# Patient Record
Sex: FEMALE | Race: WHITE | Employment: FULL TIME | URBAN - METROPOLITAN AREA
[De-identification: names, ages, dates, MRNs, and addresses within clinical notes are randomized per-mention and may not be internally consistent; named-entity substitution may affect disease eponyms.]

---

## 2017-01-03 ENCOUNTER — HOSPITAL ENCOUNTER (OUTPATIENT)
Dept: OTHER | Age: 22
Discharge: HOME | End: 2017-01-03
Attending: FAMILY MEDICINE | Admitting: CLINIC/CENTER

## 2017-01-05 LAB
CULTURE: NORMAL
REPORT STATUS: NORMAL
REQUEST PROBLEM: NORMAL
SPECIMEN: NORMAL
TOTAL COLONY COUNT: NORMAL

## 2018-08-28 LAB
C. TRACHOMATIS, EXTERNAL RESULT: NORMAL
N. GONORRHOEAE, EXTERNAL RESULT: NORMAL

## 2018-10-02 LAB
ABO, EXTERNAL RESULT: NORMAL
HEP B, EXTERNAL RESULT: NORMAL
HIV, EXTERNAL RESULT: NON REACTIVE
RHOGAM, EXTERNAL RESULT: NORMAL
RPR, EXTERNAL RESULT: NON REACTIVE
RUBELLA TITER, EXTERNAL RESULT: NORMAL

## 2018-10-11 ENCOUNTER — APPOINTMENT (OUTPATIENT)
Dept: ULTRASOUND IMAGING | Age: 23
End: 2018-10-11
Payer: COMMERCIAL

## 2018-10-11 ENCOUNTER — HOSPITAL ENCOUNTER (EMERGENCY)
Age: 23
Discharge: HOME | End: 2018-10-11
Attending: EMERGENCY MEDICINE
Payer: COMMERCIAL

## 2018-10-11 VITALS
SYSTOLIC BLOOD PRESSURE: 101 MMHG | WEIGHT: 115 LBS | RESPIRATION RATE: 15 BRPM | HEIGHT: 64 IN | BODY MASS INDEX: 19.63 KG/M2 | HEART RATE: 64 BPM | OXYGEN SATURATION: 100 % | TEMPERATURE: 97.8 F | DIASTOLIC BLOOD PRESSURE: 71 MMHG

## 2018-10-11 DIAGNOSIS — R10.9 ABDOMINAL PAIN DURING PREGNANCY, ANTEPARTUM: Primary | ICD-10-CM

## 2018-10-11 DIAGNOSIS — O26.899 ABDOMINAL PAIN DURING PREGNANCY, ANTEPARTUM: Primary | ICD-10-CM

## 2018-10-11 LAB
ALBUMIN SERPL-MCNC: 3.9 GM/DL (ref 3.4–5)
ALP BLD-CCNC: 50 IU/L (ref 40–129)
ALT SERPL-CCNC: 11 U/L (ref 10–40)
ANION GAP SERPL CALCULATED.3IONS-SCNC: 13 MMOL/L (ref 4–16)
AST SERPL-CCNC: 15 IU/L (ref 15–37)
BACTERIA: NEGATIVE /HPF
BASOPHILS ABSOLUTE: 0 K/CU MM
BASOPHILS RELATIVE PERCENT: 0.5 % (ref 0–1)
BILIRUB SERPL-MCNC: 0.3 MG/DL (ref 0–1)
BILIRUBIN URINE: NEGATIVE MG/DL
BLOOD, URINE: NEGATIVE
BUN BLDV-MCNC: 7 MG/DL (ref 6–23)
CALCIUM SERPL-MCNC: 8.8 MG/DL (ref 8.3–10.6)
CHLORIDE BLD-SCNC: 101 MMOL/L (ref 99–110)
CLARITY: CLEAR
CO2: 22 MMOL/L (ref 21–32)
COLOR: YELLOW
CREAT SERPL-MCNC: 0.4 MG/DL (ref 0.6–1.1)
DIFFERENTIAL TYPE: ABNORMAL
EOSINOPHILS ABSOLUTE: 0.1 K/CU MM
EOSINOPHILS RELATIVE PERCENT: 1 % (ref 0–3)
GFR AFRICAN AMERICAN: >60 ML/MIN/1.73M2
GFR NON-AFRICAN AMERICAN: >60 ML/MIN/1.73M2
GLUCOSE BLD-MCNC: 113 MG/DL (ref 70–99)
GLUCOSE, URINE: NEGATIVE MG/DL
GONADOTROPIN, CHORIONIC (HCG) QUANT: NORMAL UIU/ML
HCT VFR BLD CALC: 36.5 % (ref 37–47)
HEMOGLOBIN: 12.3 GM/DL (ref 12.5–16)
IMMATURE NEUTROPHIL %: 0.4 % (ref 0–0.43)
KETONES, URINE: NEGATIVE MG/DL
LEUKOCYTE ESTERASE, URINE: NEGATIVE
LIPASE: 29 IU/L (ref 13–60)
LYMPHOCYTES ABSOLUTE: 2 K/CU MM
LYMPHOCYTES RELATIVE PERCENT: 26.5 % (ref 24–44)
MCH RBC QN AUTO: 32.3 PG (ref 27–31)
MCHC RBC AUTO-ENTMCNC: 33.7 % (ref 32–36)
MCV RBC AUTO: 95.8 FL (ref 78–100)
MONOCYTES ABSOLUTE: 0.3 K/CU MM
MONOCYTES RELATIVE PERCENT: 4.4 % (ref 0–4)
MUCUS: ABNORMAL HPF
NITRITE URINE, QUANTITATIVE: NEGATIVE
NUCLEATED RBC %: 0 %
PDW BLD-RTO: 13 % (ref 11.7–14.9)
PH, URINE: 6 (ref 5–8)
PLATELET # BLD: 237 K/CU MM (ref 140–440)
PMV BLD AUTO: 8.6 FL (ref 7.5–11.1)
POTASSIUM SERPL-SCNC: 4 MMOL/L (ref 3.5–5.1)
PROTEIN UA: NEGATIVE MG/DL
RBC # BLD: 3.81 M/CU MM (ref 4.2–5.4)
RBC URINE: <1 /HPF (ref 0–6)
SEGMENTED NEUTROPHILS ABSOLUTE COUNT: 4.9 K/CU MM
SEGMENTED NEUTROPHILS RELATIVE PERCENT: 67.2 % (ref 36–66)
SODIUM BLD-SCNC: 136 MMOL/L (ref 135–145)
SPECIFIC GRAVITY UA: 1.02 (ref 1–1.03)
SQUAMOUS EPITHELIAL: 11 /HPF
TOTAL IMMATURE NEUTOROPHIL: 0.03 K/CU MM
TOTAL NUCLEATED RBC: 0 K/CU MM
TOTAL PROTEIN: 6.3 GM/DL (ref 6.4–8.2)
TRICHOMONAS: ABNORMAL /HPF
TROPONIN T: <0.01 NG/ML
UROBILINOGEN, URINE: NORMAL MG/DL (ref 0.2–1)
WBC # BLD: 7.4 K/CU MM (ref 4–10.5)
WBC UA: ABNORMAL /HPF (ref 0–5)

## 2018-10-11 PROCEDURE — 83690 ASSAY OF LIPASE: CPT

## 2018-10-11 PROCEDURE — 36415 COLL VENOUS BLD VENIPUNCTURE: CPT

## 2018-10-11 PROCEDURE — 76801 OB US < 14 WKS SINGLE FETUS: CPT

## 2018-10-11 PROCEDURE — 6370000000 HC RX 637 (ALT 250 FOR IP): Performed by: PHYSICIAN ASSISTANT

## 2018-10-11 PROCEDURE — 84702 CHORIONIC GONADOTROPIN TEST: CPT

## 2018-10-11 PROCEDURE — 80053 COMPREHEN METABOLIC PANEL: CPT

## 2018-10-11 PROCEDURE — 93975 VASCULAR STUDY: CPT

## 2018-10-11 PROCEDURE — 85025 COMPLETE CBC W/AUTO DIFF WBC: CPT

## 2018-10-11 PROCEDURE — 6360000002 HC RX W HCPCS: Performed by: PHYSICIAN ASSISTANT

## 2018-10-11 PROCEDURE — 96375 TX/PRO/DX INJ NEW DRUG ADDON: CPT

## 2018-10-11 PROCEDURE — 81001 URINALYSIS AUTO W/SCOPE: CPT

## 2018-10-11 PROCEDURE — 96374 THER/PROPH/DIAG INJ IV PUSH: CPT

## 2018-10-11 PROCEDURE — 93010 ELECTROCARDIOGRAM REPORT: CPT | Performed by: INTERNAL MEDICINE

## 2018-10-11 PROCEDURE — 2500000003 HC RX 250 WO HCPCS: Performed by: PHYSICIAN ASSISTANT

## 2018-10-11 PROCEDURE — S0028 INJECTION, FAMOTIDINE, 20 MG: HCPCS | Performed by: PHYSICIAN ASSISTANT

## 2018-10-11 PROCEDURE — 76705 ECHO EXAM OF ABDOMEN: CPT

## 2018-10-11 PROCEDURE — 93005 ELECTROCARDIOGRAM TRACING: CPT | Performed by: EMERGENCY MEDICINE

## 2018-10-11 PROCEDURE — 99285 EMERGENCY DEPT VISIT HI MDM: CPT

## 2018-10-11 PROCEDURE — 2580000003 HC RX 258: Performed by: PHYSICIAN ASSISTANT

## 2018-10-11 PROCEDURE — 84484 ASSAY OF TROPONIN QUANT: CPT

## 2018-10-11 RX ORDER — FAMOTIDINE 20 MG/1
20 TABLET, FILM COATED ORAL 2 TIMES DAILY
Qty: 30 TABLET | Refills: 0 | Status: ON HOLD | OUTPATIENT
Start: 2018-10-11 | End: 2019-03-26 | Stop reason: ALTCHOICE

## 2018-10-11 RX ORDER — ONDANSETRON 2 MG/ML
4 INJECTION INTRAMUSCULAR; INTRAVENOUS EVERY 30 MIN PRN
Status: DISCONTINUED | OUTPATIENT
Start: 2018-10-11 | End: 2018-10-11 | Stop reason: HOSPADM

## 2018-10-11 RX ORDER — MAGNESIUM HYDROXIDE/ALUMINUM HYDROXICE/SIMETHICONE 120; 1200; 1200 MG/30ML; MG/30ML; MG/30ML
30 SUSPENSION ORAL ONCE
Status: COMPLETED | OUTPATIENT
Start: 2018-10-11 | End: 2018-10-11

## 2018-10-11 RX ORDER — 0.9 % SODIUM CHLORIDE 0.9 %
1000 INTRAVENOUS SOLUTION INTRAVENOUS ONCE
Status: COMPLETED | OUTPATIENT
Start: 2018-10-11 | End: 2018-10-11

## 2018-10-11 RX ORDER — ACETAMINOPHEN 500 MG
1000 TABLET ORAL ONCE
Status: COMPLETED | OUTPATIENT
Start: 2018-10-11 | End: 2018-10-11

## 2018-10-11 RX ADMIN — ONDANSETRON 4 MG: 2 INJECTION INTRAMUSCULAR; INTRAVENOUS at 10:43

## 2018-10-11 RX ADMIN — LIDOCAINE HYDROCHLORIDE 15 ML: 20 SOLUTION ORAL; TOPICAL at 13:01

## 2018-10-11 RX ADMIN — ALUMINUM HYDROXIDE, MAGNESIUM HYDROXIDE, AND SIMETHICONE 30 ML: 200; 200; 20 SUSPENSION ORAL at 13:01

## 2018-10-11 RX ADMIN — ACETAMINOPHEN 1000 MG: 500 TABLET, FILM COATED ORAL at 10:33

## 2018-10-11 RX ADMIN — SODIUM CHLORIDE 1000 ML: 9 INJECTION, SOLUTION INTRAVENOUS at 10:41

## 2018-10-11 RX ADMIN — FAMOTIDINE 20 MG: 10 INJECTION, SOLUTION INTRAVENOUS at 10:44

## 2018-10-11 ASSESSMENT — PAIN DESCRIPTION - DESCRIPTORS: DESCRIPTORS: SHARP

## 2018-10-11 ASSESSMENT — PAIN DESCRIPTION - ONSET: ONSET: PROGRESSIVE

## 2018-10-11 ASSESSMENT — PAIN SCALES - GENERAL
PAINLEVEL_OUTOF10: 8

## 2018-10-11 ASSESSMENT — PAIN DESCRIPTION - PAIN TYPE: TYPE: ACUTE PAIN

## 2018-10-11 ASSESSMENT — PAIN DESCRIPTION - FREQUENCY: FREQUENCY: CONTINUOUS

## 2018-10-11 ASSESSMENT — PAIN DESCRIPTION - LOCATION: LOCATION: ABDOMEN

## 2018-10-11 ASSESSMENT — PAIN DESCRIPTION - ORIENTATION: ORIENTATION: MID

## 2018-10-11 ASSESSMENT — PAIN DESCRIPTION - PROGRESSION: CLINICAL_PROGRESSION: GRADUALLY WORSENING

## 2018-10-11 NOTE — ED PROVIDER NOTES
fetal movement. Fetal measurements:    BPD 22.68 mm    Head circumference 82.64 mm    Abdominal circumference 70.20 mm    Femur length 11.05 mm    Estimated fetal weight 76.25 g    Measurements:    Estimated gestational age by current ultrasound: 17 weeks and 4 days    Estimated gestational by LMP/prior ultrasound: 13 weeks and 0 days    Estimated Due Date: 04/14/2018                    US ABDOMEN LIMITED (Final result)   Result time 10/11/18 12:54:01   Final result by Trinity Nava MD (10/11/18 12:54:01)                Impression:    Unremarkable right upper quadrant ultrasound. Narrative:    EXAMINATION:  RIGHT UPPER QUADRANT ULTRASOUND    10/11/2018 11:47 am    COMPARISON:  None. HISTORY:  ORDERING SYSTEM PROVIDED HISTORY: RUQ pain  TECHNOLOGIST PROVIDED HISTORY:  Reason for exam:->RUQ pain  Acuity: Acute  Type of Exam: Initial    FINDINGS:  LIVER:  The liver demonstrates normal echogenicity without evidence of  intrahepatic biliary ductal dilatation. BILIARY SYSTEM:  Gallbladder is unremarkable without evidence of  pericholecystic fluid, wall thickening or stones.  Negative sonographic  Ryan's sign. Common bile duct is within normal limits measuring 2 mm. RIGHT KIDNEY: The right kidney is grossly unremarkable without evidence of  hydronephrosis. Right kidney measures 11.5 cm in length. PANCREAS:  Visualized portions of the pancreas are unremarkable. OTHER: No evidence of right upper quadrant ascites.                      EKG Interpretation  Please see ED physician's note for EKG interpretation      ED COURSE & MEDICAL DECISION MAKING      Vital signs and nursing notes reviewed during ED course. I have independently evaluated this patient . Supervising MD - Dr Daniel Murillo - present in the Emergency Department, available for consultation, throughout entirety of  patient care. All pertinent Lab data and radiographic results reviewed with patient at bedside.       The patient and / or the family were informed of the results of any tests, a time was given to answer questions, a plan was proposed and they agreed with plan. Differential diagnosis: Abdominal Aortic Aneurysm, Ischemic Bowel, Bowel Obstruction, Acute Cholecystitis, Acute Appendicitis, other    Clinical  IMPRESSION    1. Abdominal pain during pregnancy, antepartum        Patient presents with generalized abdominal pain/nausea and chest pain and early pregnancy. On exam, tearful 78-year-old female, afebrile, nontoxic, vitals are stable she appears him and am intact. Lungs are clear auscultation, 100% on room air. No lower extremity edema. Abdomen is soft with normoactive bowel sounds however there is generalized tenderness in the left lower quadrant abdomen and epigastric region with mild guarding, no rebound. No other peritoneal signs. Given pregnancy status, discussed the patient pain control with Pepcid and Tylenol she is agreeable with. She is already on Zofran and states that she is aware of the possible education side effects of pregnancy and is comfortable continuing this medication while in the ED. Start her on IV fluids in addition to antiemetics and pain control. CBC without leukocytosis, hemoglobin stable 12.3 compared to previous. CMP without significant derangement. Normal lipase. Troponin within normal limits. Beta hCG is elevated 56,470, UA is clear. While awaiting ultrasound, I reassessed this patient. She was noted to have a BP of 93/56 at bedside check. At this point, I consulted with my attending physician who performed a bedside FAST ultrasound which did show a live IUP and no evidence of free fluid in the abdomen. Patient is given additional oral GI cocktail and states pain significantly improved with 3/10. Blood pressure also normalized after continued fluids. Right upper quadrant ultrasound was unremarkable.   OB ultrasound shows a single live IUP with gestational age 17 weeks and 3

## 2018-10-11 NOTE — ED PROVIDER NOTES
I independently examined and evaluated Clark Rojas. In brief,  Pregnant female presenting with abdominal pain and nausea for three hours. Vitals:    10/11/18 1006   BP: 114/77   Pulse: 97   Resp: 18   Temp: 97.9 °F (36.6 °C)   SpO2: 100%     Focused exam revealed     General appearance:  No acute distress. Skin:  Warm. Dry. Eye:  Extraocular movements intact. Ears, nose, mouth and throat:  Oral mucosa moist   Neck:  Trachea midline. Extremity:  No swelling. range of motion intact  Heart:  Regular rate and rhythm, normal S1 & S2, no extra heart sounds. Perfusion:  intact  Respiratory:  Lungs clear to auscultation bilaterally. Respirations nonlabored. Abdominal:  Normal bowel sounds. Soft. lower abdominal tenderness no rebound or regarding. Non distended. Back:  No CVA tenderness to palpation     Neurological:  Alert and oriented times 3. No focal neuro deficits. Psychiatric:  Appropriate       ED course:   urine noted to be unremarkable. Ultrasound confirming IUP  No significant pathology in the adnexa. Patient discharged to home with GYN followup     gina Angulo diagnostic, treatment, and disposition decisions were made by myself in conjunction with the advanced practice provider. For all further details of the patient's emergency department visit, please see the advanced practice provider's documentation. Comment: Please note this report has been produced using speech recognition software and may contain errors related to that system including errors in grammar, punctuation, and spelling, as well as words and phrases that may be inappropriate. If there are any questions or concerns please feel free to contact the dictating provider for clarification.        Rosibel Salguero MD  10/12/18 2100       Rosibel Salguero MD  10/12/18 2539

## 2018-10-12 LAB
EKG ATRIAL RATE: 86 BPM
EKG DIAGNOSIS: NORMAL
EKG P AXIS: 65 DEGREES
EKG P-R INTERVAL: 108 MS
EKG Q-T INTERVAL: 358 MS
EKG QRS DURATION: 82 MS
EKG QTC CALCULATION (BAZETT): 428 MS
EKG R AXIS: 84 DEGREES
EKG T AXIS: 47 DEGREES
EKG VENTRICULAR RATE: 86 BPM

## 2019-03-20 LAB
GBS, EXTERNAL RESULT: NEGATIVE
GBS, EXTERNAL RESULT: NORMAL

## 2019-03-26 ENCOUNTER — HOSPITAL ENCOUNTER (OUTPATIENT)
Age: 24
Discharge: HOME | End: 2019-03-26
Attending: OBSTETRICS & GYNECOLOGY | Admitting: OBSTETRICS & GYNECOLOGY
Payer: COMMERCIAL

## 2019-03-26 VITALS
RESPIRATION RATE: 18 BRPM | SYSTOLIC BLOOD PRESSURE: 127 MMHG | HEART RATE: 79 BPM | BODY MASS INDEX: 23.74 KG/M2 | WEIGHT: 134 LBS | TEMPERATURE: 95.8 F | OXYGEN SATURATION: 98 % | DIASTOLIC BLOOD PRESSURE: 83 MMHG | HEIGHT: 63 IN

## 2019-03-26 PROBLEM — Z33.1 PREGNANCY, INCIDENTAL: Status: ACTIVE | Noted: 2019-03-26

## 2019-03-26 LAB
AMPHETAMINES: NEGATIVE
BACTERIA: ABNORMAL /HPF
BARBITURATE SCREEN URINE: NEGATIVE
BENZODIAZEPINE SCREEN, URINE: NEGATIVE
BILIRUBIN URINE: NEGATIVE MG/DL
BLOOD, URINE: NEGATIVE
CANNABINOID SCREEN URINE: NEGATIVE
CLARITY: CLEAR
COCAINE METABOLITE: NEGATIVE
COLOR: YELLOW
GLUCOSE, URINE: NEGATIVE MG/DL
KETONES, URINE: NEGATIVE MG/DL
LEUKOCYTE ESTERASE, URINE: ABNORMAL
MUCUS: ABNORMAL HPF
NITRITE URINE, QUANTITATIVE: NEGATIVE
OPIATES, URINE: NEGATIVE
OXYCODONE: NORMAL
PH, URINE: 7 (ref 5–8)
PHENCYCLIDINE, URINE: NEGATIVE
PROTEIN UA: 30 MG/DL
RBC URINE: 1 /HPF (ref 0–6)
SPECIFIC GRAVITY UA: 1.02 (ref 1–1.03)
SQUAMOUS EPITHELIAL: 20 /HPF
TRANSITIONAL EPITHELIAL: <1 /HPF
TRICHOMONAS: ABNORMAL /HPF
UROBILINOGEN, URINE: 1 MG/DL (ref 0.2–1)
WBC UA: 1 /HPF (ref 0–5)

## 2019-03-26 PROCEDURE — 80307 DRUG TEST PRSMV CHEM ANLYZR: CPT

## 2019-03-26 PROCEDURE — 99211 OFF/OP EST MAY X REQ PHY/QHP: CPT

## 2019-03-26 PROCEDURE — 81001 URINALYSIS AUTO W/SCOPE: CPT

## 2019-03-28 ENCOUNTER — APPOINTMENT (OUTPATIENT)
Dept: ULTRASOUND IMAGING | Age: 24
End: 2019-03-28
Payer: COMMERCIAL

## 2019-03-28 ENCOUNTER — HOSPITAL ENCOUNTER (OUTPATIENT)
Age: 24
Discharge: HOME | End: 2019-03-28
Attending: OBSTETRICS & GYNECOLOGY | Admitting: OBSTETRICS & GYNECOLOGY
Payer: COMMERCIAL

## 2019-03-28 VITALS
BODY MASS INDEX: 23.74 KG/M2 | OXYGEN SATURATION: 100 % | DIASTOLIC BLOOD PRESSURE: 65 MMHG | SYSTOLIC BLOOD PRESSURE: 106 MMHG | HEART RATE: 92 BPM | WEIGHT: 134 LBS | TEMPERATURE: 98.4 F | RESPIRATION RATE: 18 BRPM | HEIGHT: 63 IN

## 2019-03-28 PROBLEM — O26.90 PREGNANCY RELATED CONDITION: Status: ACTIVE | Noted: 2019-03-28

## 2019-03-28 LAB
ALBUMIN SERPL-MCNC: 3.4 GM/DL (ref 3.4–5)
ALP BLD-CCNC: 163 IU/L (ref 40–129)
ALT SERPL-CCNC: 26 U/L (ref 10–40)
ANION GAP SERPL CALCULATED.3IONS-SCNC: 11 MMOL/L (ref 4–16)
AST SERPL-CCNC: 30 IU/L (ref 15–37)
BACTERIA: ABNORMAL /HPF
BASOPHILS ABSOLUTE: 0 K/CU MM
BASOPHILS RELATIVE PERCENT: 0.1 % (ref 0–1)
BILIRUB SERPL-MCNC: 0.7 MG/DL (ref 0–1)
BILIRUBIN URINE: ABNORMAL MG/DL
BLOOD, URINE: NEGATIVE
BUN BLDV-MCNC: 8 MG/DL (ref 6–23)
CALCIUM SERPL-MCNC: 7.6 MG/DL (ref 8.3–10.6)
CHLORIDE BLD-SCNC: 99 MMOL/L (ref 99–110)
CLARITY: CLEAR
CO2: 20 MMOL/L (ref 21–32)
COLOR: ABNORMAL
CREAT SERPL-MCNC: 0.4 MG/DL (ref 0.6–1.1)
DIFFERENTIAL TYPE: ABNORMAL
EOSINOPHILS ABSOLUTE: 0 K/CU MM
EOSINOPHILS RELATIVE PERCENT: 0.6 % (ref 0–3)
GFR AFRICAN AMERICAN: >60 ML/MIN/1.73M2
GFR NON-AFRICAN AMERICAN: >60 ML/MIN/1.73M2
GLUCOSE BLD-MCNC: 67 MG/DL (ref 70–99)
GLUCOSE, URINE: NEGATIVE MG/DL
HCT VFR BLD CALC: 33.2 % (ref 37–47)
HEMOGLOBIN: 11.1 GM/DL (ref 12.5–16)
ICTOTEST: NEGATIVE
IMMATURE NEUTROPHIL %: 0.4 % (ref 0–0.43)
KETONES, URINE: ABNORMAL MG/DL
LEUKOCYTE ESTERASE, URINE: NEGATIVE
LYMPHOCYTES ABSOLUTE: 1.1 K/CU MM
LYMPHOCYTES RELATIVE PERCENT: 15.5 % (ref 24–44)
MCH RBC QN AUTO: 31.5 PG (ref 27–31)
MCHC RBC AUTO-ENTMCNC: 33.4 % (ref 32–36)
MCV RBC AUTO: 94.3 FL (ref 78–100)
MONOCYTES ABSOLUTE: 0.4 K/CU MM
MONOCYTES RELATIVE PERCENT: 5.6 % (ref 0–4)
MUCUS: ABNORMAL HPF
NITRITE URINE, QUANTITATIVE: NEGATIVE
NUCLEATED RBC %: 0 %
PDW BLD-RTO: 12.1 % (ref 11.7–14.9)
PH, URINE: 5 (ref 5–8)
PLATELET # BLD: 224 K/CU MM (ref 140–440)
PMV BLD AUTO: 10.2 FL (ref 7.5–11.1)
POTASSIUM SERPL-SCNC: 3.6 MMOL/L (ref 3.5–5.1)
PROTEIN UA: 30 MG/DL
RBC # BLD: 3.52 M/CU MM (ref 4.2–5.4)
RBC URINE: ABNORMAL /HPF (ref 0–6)
REASON FOR REJECTION: NORMAL
REASON FOR REJECTION: NORMAL
REJECTED TEST: NORMAL
SEGMENTED NEUTROPHILS ABSOLUTE COUNT: 5.6 K/CU MM
SEGMENTED NEUTROPHILS RELATIVE PERCENT: 77.8 % (ref 36–66)
SODIUM BLD-SCNC: 130 MMOL/L (ref 135–145)
SPECIFIC GRAVITY UA: 1.03 (ref 1–1.03)
SPECIFIC GRAVITY UA: ABNORMAL (ref 1–1.03)
SQUAMOUS EPITHELIAL: 13 /HPF
TOTAL IMMATURE NEUTOROPHIL: 0.03 K/CU MM
TOTAL NUCLEATED RBC: 0 K/CU MM
TOTAL PROTEIN: 5.6 GM/DL (ref 6.4–8.2)
TRANSITIONAL EPITHELIAL: 1 /HPF
TRICHOMONAS: ABNORMAL /HPF
UROBILINOGEN, URINE: 2 MG/DL (ref 0.2–1)
WBC # BLD: 7.1 K/CU MM (ref 4–10.5)
WBC UA: <1 /HPF (ref 0–5)

## 2019-03-28 PROCEDURE — 76705 ECHO EXAM OF ABDOMEN: CPT

## 2019-03-28 PROCEDURE — 85025 COMPLETE CBC W/AUTO DIFF WBC: CPT

## 2019-03-28 PROCEDURE — 2580000003 HC RX 258: Performed by: OBSTETRICS & GYNECOLOGY

## 2019-03-28 PROCEDURE — 96360 HYDRATION IV INFUSION INIT: CPT

## 2019-03-28 PROCEDURE — 87086 URINE CULTURE/COLONY COUNT: CPT

## 2019-03-28 PROCEDURE — 96366 THER/PROPH/DIAG IV INF ADDON: CPT

## 2019-03-28 PROCEDURE — 81001 URINALYSIS AUTO W/SCOPE: CPT

## 2019-03-28 PROCEDURE — 99211 OFF/OP EST MAY X REQ PHY/QHP: CPT

## 2019-03-28 PROCEDURE — 80053 COMPREHEN METABOLIC PANEL: CPT

## 2019-03-28 RX ORDER — SODIUM CHLORIDE, SODIUM LACTATE, POTASSIUM CHLORIDE, CALCIUM CHLORIDE 600; 310; 30; 20 MG/100ML; MG/100ML; MG/100ML; MG/100ML
INJECTION, SOLUTION INTRAVENOUS CONTINUOUS
Status: DISCONTINUED | OUTPATIENT
Start: 2019-03-28 | End: 2019-03-29 | Stop reason: HOSPADM

## 2019-03-28 RX ADMIN — SODIUM CHLORIDE, POTASSIUM CHLORIDE, SODIUM LACTATE AND CALCIUM CHLORIDE: 600; 310; 30; 20 INJECTION, SOLUTION INTRAVENOUS at 20:30

## 2019-03-28 RX ADMIN — SODIUM CHLORIDE, POTASSIUM CHLORIDE, SODIUM LACTATE AND CALCIUM CHLORIDE: 600; 310; 30; 20 INJECTION, SOLUTION INTRAVENOUS at 19:30

## 2019-03-28 NOTE — FLOWSHEET NOTE
Transport to LT-02 to transfer patient to [facility]. External monitors unplugged. Patient off BC to [facility] via wheelchair by transporter. Patient denies any needs @ this time and without distress.

## 2019-03-29 NOTE — FLOWSHEET NOTE
Discharge paperwork given and explained to patient, she verbalized understanding. Patient ambulated out of [facility] upon discharge, no distress noted.

## 2019-03-29 NOTE — FLOWSHEET NOTE
TR to Dr. Wang Patience regarding status update on labs, FMS, and US results. Orders for discharge. RN voiced understanding.

## 2019-03-30 LAB
CULTURE: ABNORMAL
Lab: ABNORMAL
SPECIMEN: ABNORMAL
TOTAL COLONY COUNT: ABNORMAL

## 2019-04-11 ENCOUNTER — HOSPITAL ENCOUNTER (INPATIENT)
Age: 24
LOS: 2 days | Discharge: HOME | DRG: 560 | End: 2019-04-13
Attending: OBSTETRICS & GYNECOLOGY | Admitting: OBSTETRICS & GYNECOLOGY
Payer: COMMERCIAL

## 2019-04-11 ENCOUNTER — ANESTHESIA (OUTPATIENT)
Dept: LABOR AND DELIVERY | Age: 24
DRG: 560 | End: 2019-04-11
Payer: COMMERCIAL

## 2019-04-11 ENCOUNTER — ANESTHESIA EVENT (OUTPATIENT)
Dept: LABOR AND DELIVERY | Age: 24
DRG: 560 | End: 2019-04-11
Payer: COMMERCIAL

## 2019-04-11 PROBLEM — O26.93 PREGNANCY RELATED CONDITION IN THIRD TRIMESTER: Status: ACTIVE | Noted: 2019-04-11

## 2019-04-11 LAB
ABO/RH: NORMAL
AMPHETAMINES: NEGATIVE
ANTIBODY SCREEN: NEGATIVE
BACTERIA: NEGATIVE /HPF
BARBITURATE SCREEN URINE: NEGATIVE
BASOPHILS ABSOLUTE: 0 K/CU MM
BASOPHILS RELATIVE PERCENT: 0.4 % (ref 0–1)
BENZODIAZEPINE SCREEN, URINE: NEGATIVE
BILIRUBIN URINE: NEGATIVE MG/DL
BLOOD, URINE: NEGATIVE
CANNABINOID SCREEN URINE: NEGATIVE
CLARITY: CLEAR
COCAINE METABOLITE: NEGATIVE
COLOR: YELLOW
DIFFERENTIAL TYPE: ABNORMAL
EOSINOPHILS ABSOLUTE: 0 K/CU MM
EOSINOPHILS RELATIVE PERCENT: 0.5 % (ref 0–3)
GLUCOSE, URINE: NEGATIVE MG/DL
HCT VFR BLD CALC: 35.9 % (ref 37–47)
HEMOGLOBIN: 11.3 GM/DL (ref 12.5–16)
IMMATURE NEUTROPHIL %: 0.6 % (ref 0–0.43)
KETONES, URINE: NEGATIVE MG/DL
LEUKOCYTE ESTERASE, URINE: NEGATIVE
LYMPHOCYTES ABSOLUTE: 2.1 K/CU MM
LYMPHOCYTES RELATIVE PERCENT: 26.3 % (ref 24–44)
MCH RBC QN AUTO: 30.1 PG (ref 27–31)
MCHC RBC AUTO-ENTMCNC: 31.5 % (ref 32–36)
MCV RBC AUTO: 95.7 FL (ref 78–100)
MONOCYTES ABSOLUTE: 0.4 K/CU MM
MONOCYTES RELATIVE PERCENT: 5.1 % (ref 0–4)
MUCUS: ABNORMAL HPF
NITRITE URINE, QUANTITATIVE: NEGATIVE
NUCLEATED RBC %: 0 %
OPIATES, URINE: NEGATIVE
OXYCODONE: NORMAL
PDW BLD-RTO: 12.1 % (ref 11.7–14.9)
PH, URINE: 7 (ref 5–8)
PHENCYCLIDINE, URINE: NEGATIVE
PLATELET # BLD: 270 K/CU MM (ref 140–440)
PMV BLD AUTO: 10.3 FL (ref 7.5–11.1)
PROTEIN UA: NEGATIVE MG/DL
RBC # BLD: 3.75 M/CU MM (ref 4.2–5.4)
RBC URINE: <1 /HPF (ref 0–6)
SEGMENTED NEUTROPHILS ABSOLUTE COUNT: 5.3 K/CU MM
SEGMENTED NEUTROPHILS RELATIVE PERCENT: 67.1 % (ref 36–66)
SPECIFIC GRAVITY UA: 1.01 (ref 1–1.03)
SQUAMOUS EPITHELIAL: 4 /HPF
TOTAL IMMATURE NEUTOROPHIL: 0.05 K/CU MM
TOTAL NUCLEATED RBC: 0 K/CU MM
TRICHOMONAS: ABNORMAL /HPF
UROBILINOGEN, URINE: NORMAL MG/DL (ref 0.2–1)
WBC # BLD: 7.9 K/CU MM (ref 4–10.5)
WBC UA: ABNORMAL /HPF (ref 0–5)

## 2019-04-11 PROCEDURE — 6360000002 HC RX W HCPCS

## 2019-04-11 PROCEDURE — 2580000003 HC RX 258: Performed by: OBSTETRICS & GYNECOLOGY

## 2019-04-11 PROCEDURE — 6360000002 HC RX W HCPCS: Performed by: NURSE ANESTHETIST, CERTIFIED REGISTERED

## 2019-04-11 PROCEDURE — 80307 DRUG TEST PRSMV CHEM ANLYZR: CPT

## 2019-04-11 PROCEDURE — 2580000003 HC RX 258

## 2019-04-11 PROCEDURE — 1220000000 HC SEMI PRIVATE OB R&B

## 2019-04-11 PROCEDURE — 86901 BLOOD TYPING SEROLOGIC RH(D): CPT

## 2019-04-11 PROCEDURE — 4A1H7CZ MONITORING OF PRODUCTS OF CONCEPTION, CARDIAC RATE, VIA NATURAL OR ARTIFICIAL OPENING: ICD-10-PCS | Performed by: OBSTETRICS & GYNECOLOGY

## 2019-04-11 PROCEDURE — 86900 BLOOD TYPING SEROLOGIC ABO: CPT

## 2019-04-11 PROCEDURE — 81001 URINALYSIS AUTO W/SCOPE: CPT

## 2019-04-11 PROCEDURE — 10907ZC DRAINAGE OF AMNIOTIC FLUID, THERAPEUTIC FROM PRODUCTS OF CONCEPTION, VIA NATURAL OR ARTIFICIAL OPENING: ICD-10-PCS | Performed by: OBSTETRICS & GYNECOLOGY

## 2019-04-11 PROCEDURE — 3700000025 EPIDURAL BLOCK: Performed by: NURSE ANESTHETIST, CERTIFIED REGISTERED

## 2019-04-11 PROCEDURE — 2500000003 HC RX 250 WO HCPCS: Performed by: NURSE ANESTHETIST, CERTIFIED REGISTERED

## 2019-04-11 PROCEDURE — 86850 RBC ANTIBODY SCREEN: CPT

## 2019-04-11 PROCEDURE — 3E033VJ INTRODUCTION OF OTHER HORMONE INTO PERIPHERAL VEIN, PERCUTANEOUS APPROACH: ICD-10-PCS | Performed by: OBSTETRICS & GYNECOLOGY

## 2019-04-11 PROCEDURE — 10H07YZ INSERTION OF OTHER DEVICE INTO PRODUCTS OF CONCEPTION, VIA NATURAL OR ARTIFICIAL OPENING: ICD-10-PCS | Performed by: OBSTETRICS & GYNECOLOGY

## 2019-04-11 PROCEDURE — 51702 INSERT TEMP BLADDER CATH: CPT

## 2019-04-11 PROCEDURE — 85025 COMPLETE CBC W/AUTO DIFF WBC: CPT

## 2019-04-11 RX ORDER — SODIUM CHLORIDE 9 MG/ML
INJECTION, SOLUTION INTRAVENOUS ONCE
Status: COMPLETED | OUTPATIENT
Start: 2019-04-11 | End: 2019-04-11

## 2019-04-11 RX ORDER — SODIUM CHLORIDE, SODIUM LACTATE, POTASSIUM CHLORIDE, CALCIUM CHLORIDE 600; 310; 30; 20 MG/100ML; MG/100ML; MG/100ML; MG/100ML
INJECTION, SOLUTION INTRAVENOUS CONTINUOUS
Status: DISCONTINUED | OUTPATIENT
Start: 2019-04-11 | End: 2019-04-12

## 2019-04-11 RX ORDER — LIDOCAINE HYDROCHLORIDE AND EPINEPHRINE 20; 5 MG/ML; UG/ML
INJECTION, SOLUTION EPIDURAL; INFILTRATION; INTRACAUDAL; PERINEURAL PRN
Status: DISCONTINUED | OUTPATIENT
Start: 2019-04-11 | End: 2019-04-12 | Stop reason: SDUPTHER

## 2019-04-11 RX ORDER — SODIUM CHLORIDE 0.9 % (FLUSH) 0.9 %
10 SYRINGE (ML) INJECTION PRN
Status: DISCONTINUED | OUTPATIENT
Start: 2019-04-11 | End: 2019-04-12

## 2019-04-11 RX ORDER — ROPIVACAINE HYDROCHLORIDE 2 MG/ML
10 INJECTION, SOLUTION EPIDURAL; INFILTRATION; PERINEURAL CONTINUOUS
Status: DISCONTINUED | OUTPATIENT
Start: 2019-04-11 | End: 2019-04-12

## 2019-04-11 RX ORDER — TERBUTALINE SULFATE 1 MG/ML
0.25 INJECTION, SOLUTION SUBCUTANEOUS ONCE
Status: COMPLETED | OUTPATIENT
Start: 2019-04-11 | End: 2019-04-11

## 2019-04-11 RX ORDER — ROPIVACAINE HYDROCHLORIDE 2 MG/ML
INJECTION, SOLUTION EPIDURAL; INFILTRATION; PERINEURAL CONTINUOUS PRN
Status: DISCONTINUED | OUTPATIENT
Start: 2019-04-11 | End: 2019-04-12 | Stop reason: SDUPTHER

## 2019-04-11 RX ORDER — TERBUTALINE SULFATE 1 MG/ML
0.25 INJECTION, SOLUTION SUBCUTANEOUS ONCE
Status: DISCONTINUED | OUTPATIENT
Start: 2019-04-11 | End: 2019-04-12

## 2019-04-11 RX ORDER — ONDANSETRON 2 MG/ML
4 INJECTION INTRAMUSCULAR; INTRAVENOUS EVERY 6 HOURS PRN
Status: DISCONTINUED | OUTPATIENT
Start: 2019-04-11 | End: 2019-04-12

## 2019-04-11 RX ORDER — SODIUM CHLORIDE 9 MG/ML
INJECTION, SOLUTION INTRAVENOUS
Status: COMPLETED
Start: 2019-04-11 | End: 2019-04-11

## 2019-04-11 RX ORDER — TERBUTALINE SULFATE 1 MG/ML
INJECTION, SOLUTION SUBCUTANEOUS
Status: COMPLETED
Start: 2019-04-11 | End: 2019-04-11

## 2019-04-11 RX ORDER — FENTANYL CITRATE 50 UG/ML
100 INJECTION, SOLUTION INTRAMUSCULAR; INTRAVENOUS
Status: DISCONTINUED | OUTPATIENT
Start: 2019-04-11 | End: 2019-04-12

## 2019-04-11 RX ORDER — ROPIVACAINE HYDROCHLORIDE 2 MG/ML
INJECTION, SOLUTION EPIDURAL; INFILTRATION; PERINEURAL PRN
Status: DISCONTINUED | OUTPATIENT
Start: 2019-04-11 | End: 2019-04-12 | Stop reason: SDUPTHER

## 2019-04-11 RX ORDER — SODIUM CHLORIDE 0.9 % (FLUSH) 0.9 %
10 SYRINGE (ML) INJECTION EVERY 12 HOURS SCHEDULED
Status: DISCONTINUED | OUTPATIENT
Start: 2019-04-11 | End: 2019-04-12

## 2019-04-11 RX ADMIN — SODIUM CHLORIDE: 9 INJECTION, SOLUTION INTRAVENOUS at 21:08

## 2019-04-11 RX ADMIN — SODIUM CHLORIDE, POTASSIUM CHLORIDE, SODIUM LACTATE AND CALCIUM CHLORIDE: 600; 310; 30; 20 INJECTION, SOLUTION INTRAVENOUS at 17:56

## 2019-04-11 RX ADMIN — ROPIVACAINE HYDROCHLORIDE 5 ML: 2 INJECTION, SOLUTION EPIDURAL; INFILTRATION at 19:53

## 2019-04-11 RX ADMIN — TERBUTALINE SULFATE 0.25 MG: 1 INJECTION, SOLUTION SUBCUTANEOUS at 12:38

## 2019-04-11 RX ADMIN — SODIUM CHLORIDE, POTASSIUM CHLORIDE, SODIUM LACTATE AND CALCIUM CHLORIDE: 600; 310; 30; 20 INJECTION, SOLUTION INTRAVENOUS at 12:30

## 2019-04-11 RX ADMIN — ROPIVACAINE HYDROCHLORIDE 10 ML/HR: 2 INJECTION, SOLUTION EPIDURAL; INFILTRATION at 19:55

## 2019-04-11 RX ADMIN — Medication 1 MILLI-UNITS/MIN: at 13:30

## 2019-04-11 RX ADMIN — SODIUM CHLORIDE, POTASSIUM CHLORIDE, SODIUM LACTATE AND CALCIUM CHLORIDE: 600; 310; 30; 20 INJECTION, SOLUTION INTRAVENOUS at 23:17

## 2019-04-11 RX ADMIN — SODIUM CHLORIDE, POTASSIUM CHLORIDE, SODIUM LACTATE AND CALCIUM CHLORIDE: 600; 310; 30; 20 INJECTION, SOLUTION INTRAVENOUS at 20:00

## 2019-04-11 RX ADMIN — TERBUTALINE SULFATE 0.25 MG: 1 INJECTION SUBCUTANEOUS at 12:38

## 2019-04-11 RX ADMIN — LIDOCAINE HYDROCHLORIDE AND EPINEPHRINE 3 ML: 20; 5 INJECTION, SOLUTION EPIDURAL; INFILTRATION; INTRACAUDAL; PERINEURAL at 19:52

## 2019-04-11 ASSESSMENT — PAIN DESCRIPTION - DESCRIPTORS
DESCRIPTORS: CRAMPING
DESCRIPTORS: TIGHTNESS
DESCRIPTORS: TIGHTNESS;PRESSURE

## 2019-04-11 ASSESSMENT — PAIN SCALES - GENERAL: PAINLEVEL_OUTOF10: 7

## 2019-04-11 NOTE — PROCEDURES
Ultrasound  Breech (complete)  BPP 10/10  AC 37w5d    Successful ECV    Ultrasound post procedure revealed cephalic and BPP 8/8.

## 2019-04-11 NOTE — H&P
Department of Obstetrics and Gynecology   Obstetrics History and Physical        CHIEF COMPLAINT:  breech    HISTORY OF PRESENT ILLNESS:      The patient is a 30 y.o. female at 39w0d. OB History        5    Para   3    Term   3            AB   1    Living   3       SAB   1    TAB        Ectopic        Molar        Multiple        Live Births   3            Patient presents with a chief complaint as above and is being admitted for external cephalic version with induction or  following the ECV. Estimated Due Date: Estimated Date of Delivery: 19    PRENATAL CARE:    Complicated by: Breech    PAST OB HISTORY  OB History        5    Para   3    Term   3            AB   1    Living   3       SAB   1    TAB        Ectopic        Molar        Multiple        Live Births   3                Past Medical History:        Diagnosis Date    Arthritis     knees    Pyelonephritis complicating pregnancy 78/3/7099     Past Surgical History:        Procedure Laterality Date    APPENDECTOMY      CERVICAL POLYP REMOVAL      uterine polyp     Allergies:  Patient has no known allergies.   Social History:    Social History     Socioeconomic History    Marital status: Single     Spouse name: Not on file    Number of children: Not on file    Years of education: Not on file    Highest education level: Not on file   Occupational History    Not on file   Social Needs    Financial resource strain: Not on file    Food insecurity:     Worry: Not on file     Inability: Not on file    Transportation needs:     Medical: Not on file     Non-medical: Not on file   Tobacco Use    Smoking status: Current Every Day Smoker     Packs/day: 0.25     Years: 5.00     Pack years: 1.25     Types: Cigarettes    Smokeless tobacco: Never Used    Tobacco comment: states has tried numerous times but cannot   Substance and Sexual Activity    Alcohol use: No    Drug use: No    Sexual activity: Yes Partners: Male     Comment: 3/25/19   Lifestyle    Physical activity:     Days per week: Not on file     Minutes per session: Not on file    Stress: Not on file   Relationships    Social connections:     Talks on phone: Not on file     Gets together: Not on file     Attends [employer] service: Not on file     Active member of club or organization: Not on file     Attends meetings of clubs or organizations: Not on file     Relationship status: Not on file    Intimate partner violence:     Fear of current or ex partner: Not on file     Emotionally abused: Not on file     Physically abused: Not on file     Forced sexual activity: Not on file   Other Topics Concern    Not on file   Social History Narrative    Not on file     Family History:       Problem Relation Age of Onset    Cancer Father     Early Death Father     Other Brother         hersprungs disease    Allergy (Severe) Brother     Anemia Brother     Asthma Brother     Birth Defects Brother     Arthritis Mother     Depression Mother    Abel Julienon / Blas Childresson Mother     Depression Maternal Aunt     Substance Abuse Maternal Aunt     Substance Abuse Maternal Uncle     Early Death Maternal Uncle     Alcohol Abuse Maternal Grandmother     Arthritis Maternal Grandmother     Asthma Maternal Grandmother     Depression Maternal Grandmother     High Blood Pressure Maternal Grandmother     Miscarriages / Stillbirths Maternal Grandmother     Substance Abuse Maternal Grandmother     Alcohol Abuse Maternal Grandfather     Arthritis Maternal Grandfather     Asthma Maternal Grandfather     Coronary Art Dis Maternal Grandfather     Heart Attack Maternal Grandfather     Heart Disease Maternal Grandfather     Stroke Maternal Grandfather     Substance Abuse Maternal Grandfather     Alcohol Abuse Paternal Grandmother     Arthritis Paternal Grandmother     Asthma Paternal Grandmother     Arthritis Paternal Grandfather     Asthma Paternal Grandfather      Medications Prior to Admission:  Medications Prior to Admission: ondansetron (ZOFRAN ODT) 4 MG disintegrating tablet, Take 4 mg by mouth every 8 hours as needed for Nausea or Vomiting  diphenhydrAMINE (BENADRYL) 25 MG capsule, Take 25 mg by mouth nightly as needed for Itching    REVIEW OF SYSTEMS:    CONSTITUTIONAL:  negative  RESPIRATORY:  negative  CARDIOVASCULAR:  negative  GASTROINTESTINAL:  negative  ALLERGIC/IMMUNOLOGIC:  negative  NEUROLOGICAL:  negative  BEHAVIOR/PSYCH:  negative    PHYSICAL EXAM:  Last menstrual period 2018, unknown if currently breastfeeding. General appearance:  awake, alert, cooperative, no apparent distress, and appears stated age  Neurologic:  Awake, alert, oriented to name, place and time. Lungs:  No increased work of breathing, good air exchange  Abdomen:  Soft, non tender, gravid, consistent with her gestational age, EFW by Maru's manouever was 3200gm   Fetal heart rate:    Category 1     Pelvis:  Adequate pelvis        Contraction frequency:  9 minutes    Membranes:  Intact    Ultrasound  Breech presentation, adequate AFV    ASSESSMENT AND PLAN:  IUP at 39w 0d with breech presentation for External cephalic version followed by induction if successful or  if ECV is unsuccessful.

## 2019-04-11 NOTE — FLOWSHEET NOTE
Presented ambulatory for scheduled version. Shown to room LT05. Urine obtained and gown changed. Hx reviewed and assessment done. Fetal monitors on.

## 2019-04-12 PROCEDURE — 1220000000 HC SEMI PRIVATE OB R&B

## 2019-04-12 PROCEDURE — 6370000000 HC RX 637 (ALT 250 FOR IP): Performed by: OBSTETRICS & GYNECOLOGY

## 2019-04-12 PROCEDURE — 7200000001 HC VAGINAL DELIVERY

## 2019-04-12 PROCEDURE — 6360000002 HC RX W HCPCS: Performed by: OBSTETRICS & GYNECOLOGY

## 2019-04-12 RX ORDER — HYDROCODONE BITARTRATE AND ACETAMINOPHEN 5; 325 MG/1; MG/1
1 TABLET ORAL EVERY 6 HOURS PRN
Status: DISCONTINUED | OUTPATIENT
Start: 2019-04-12 | End: 2019-04-13 | Stop reason: HOSPADM

## 2019-04-12 RX ORDER — LANOLIN 100 %
OINTMENT (GRAM) TOPICAL PRN
Status: DISCONTINUED | OUTPATIENT
Start: 2019-04-12 | End: 2019-04-13 | Stop reason: HOSPADM

## 2019-04-12 RX ORDER — MISOPROSTOL 200 UG/1
800 TABLET ORAL PRN
Status: DISCONTINUED | OUTPATIENT
Start: 2019-04-12 | End: 2019-04-13 | Stop reason: HOSPADM

## 2019-04-12 RX ORDER — METHYLERGONOVINE MALEATE 0.2 MG/ML
200 INJECTION INTRAVENOUS
Status: ACTIVE | OUTPATIENT
Start: 2019-04-12 | End: 2019-04-12

## 2019-04-12 RX ORDER — IBUPROFEN 800 MG/1
800 TABLET ORAL EVERY 8 HOURS
Status: DISCONTINUED | OUTPATIENT
Start: 2019-04-12 | End: 2019-04-13 | Stop reason: HOSPADM

## 2019-04-12 RX ORDER — NICOTINE 21 MG/24HR
1 PATCH, TRANSDERMAL 24 HOURS TRANSDERMAL DAILY
Status: DISCONTINUED | OUTPATIENT
Start: 2019-04-12 | End: 2019-04-13 | Stop reason: HOSPADM

## 2019-04-12 RX ORDER — SODIUM CHLORIDE 0.9 % (FLUSH) 0.9 %
10 SYRINGE (ML) INJECTION PRN
Status: DISCONTINUED | OUTPATIENT
Start: 2019-04-12 | End: 2019-04-13 | Stop reason: HOSPADM

## 2019-04-12 RX ORDER — HYDROCODONE BITARTRATE AND ACETAMINOPHEN 5; 325 MG/1; MG/1
2 TABLET ORAL EVERY 6 HOURS PRN
Status: DISCONTINUED | OUTPATIENT
Start: 2019-04-12 | End: 2019-04-13 | Stop reason: HOSPADM

## 2019-04-12 RX ORDER — SODIUM CHLORIDE 0.9 % (FLUSH) 0.9 %
10 SYRINGE (ML) INJECTION EVERY 12 HOURS SCHEDULED
Status: DISCONTINUED | OUTPATIENT
Start: 2019-04-12 | End: 2019-04-13 | Stop reason: HOSPADM

## 2019-04-12 RX ORDER — DOCUSATE SODIUM 100 MG/1
100 CAPSULE, LIQUID FILLED ORAL 2 TIMES DAILY
Status: DISCONTINUED | OUTPATIENT
Start: 2019-04-12 | End: 2019-04-13 | Stop reason: HOSPADM

## 2019-04-12 RX ORDER — ONDANSETRON 4 MG/1
4 TABLET, ORALLY DISINTEGRATING ORAL EVERY 6 HOURS PRN
Status: DISCONTINUED | OUTPATIENT
Start: 2019-04-12 | End: 2019-04-13 | Stop reason: HOSPADM

## 2019-04-12 RX ORDER — SIMETHICONE 80 MG
80 TABLET,CHEWABLE ORAL EVERY 6 HOURS PRN
Status: DISCONTINUED | OUTPATIENT
Start: 2019-04-12 | End: 2019-04-13 | Stop reason: HOSPADM

## 2019-04-12 RX ORDER — ACETAMINOPHEN 325 MG/1
650 TABLET ORAL EVERY 4 HOURS PRN
Status: DISCONTINUED | OUTPATIENT
Start: 2019-04-12 | End: 2019-04-13 | Stop reason: HOSPADM

## 2019-04-12 RX ADMIN — DOCUSATE SODIUM 100 MG: 100 CAPSULE, LIQUID FILLED ORAL at 10:14

## 2019-04-12 RX ADMIN — IBUPROFEN 800 MG: 200 TABLET, FILM COATED ORAL at 20:58

## 2019-04-12 RX ADMIN — Medication 999 MILLI-UNITS/MIN: at 01:50

## 2019-04-12 RX ADMIN — HYDROCODONE BITARTRATE AND ACETAMINOPHEN 1 TABLET: 5; 325 TABLET ORAL at 17:59

## 2019-04-12 RX ADMIN — ONDANSETRON 4 MG: 2 INJECTION INTRAMUSCULAR; INTRAVENOUS at 02:05

## 2019-04-12 RX ADMIN — IBUPROFEN 800 MG: 200 TABLET, FILM COATED ORAL at 04:07

## 2019-04-12 RX ADMIN — DOCUSATE SODIUM 100 MG: 100 CAPSULE, LIQUID FILLED ORAL at 20:58

## 2019-04-12 RX ADMIN — IBUPROFEN 800 MG: 200 TABLET, FILM COATED ORAL at 12:48

## 2019-04-12 ASSESSMENT — PAIN DESCRIPTION - DESCRIPTORS
DESCRIPTORS: CRAMPING
DESCRIPTORS: CRAMPING

## 2019-04-12 ASSESSMENT — PAIN DESCRIPTION - PROGRESSION
CLINICAL_PROGRESSION: GRADUALLY WORSENING

## 2019-04-12 ASSESSMENT — PAIN SCALES - GENERAL
PAINLEVEL_OUTOF10: 4
PAINLEVEL_OUTOF10: 5

## 2019-04-12 NOTE — ANESTHESIA POSTPROCEDURE EVALUATION
Department of Anesthesiology  Postprocedure Note    Patient: Haim Webster  MRN: [id number]  YOB: 1995  Date of evaluation: 4/12/2019  Time:  9:27 AM     Procedure Summary     Date:  04/11/19 Room / Location:      Anesthesia Start:  1943 Anesthesia Stop:  04/12/19 0140    Procedure:  ANESTHESIA LABOR ANALGESIA Diagnosis:      Scheduled Providers:   Responsible Provider:  AMPARO Worthington CRNA    Anesthesia Type:  epidural ASA Status:  2          Anesthesia Type: epidural    Raj Phase I: Raj Score: 10    Raj Phase II:      Last vitals: Reviewed and per EMR flowsheets.        Anesthesia Post Evaluation    Patient location during evaluation: floor  Patient participation: complete - patient participated  Level of consciousness: awake  Pain score: 1  Complications: no

## 2019-04-12 NOTE — FLOWSHEET NOTE
TR to Dr. Quinton Boothe, with EFM and [place] tracings, with interventions, recent SVE, order received for amnioinfusion.

## 2019-04-12 NOTE — L&D DELIVERY NOTE
Mother's Information    Labor Events    Rupture type:  Artificial=AROM, Intact  Fluid color:  Bloody Show  Fluid odor:  None     Mother Delivery Information    Surgical or Additional Est. Blood Loss (mL):  0 (View Only):  Edit in Flowsheets   Combined Est. Blood Loss (mL):  0        Omaira Melissa Pending  Kishore [[id number]]    Labor Events    Cervical ripening date/time:     Rupture date/time: 19 13:55:00   Rupture type:  Artificial=AROM, Intact  Fluid color:  Bloody Show  Fluid odor:  None          Labor Event Times    Labor onset date/time: 19 EDT   Dilation complete date/time:       Start pushing:    Decision time (emergent ):       Delivery Providers    Delivering clinician:        Measurements       Delivery Information    Surgical or additional est. blood loss (mL):  0 (View Only):  Edit in Flowsheets   Combined est. blood loss (mL):  0            Department of Obstetrics and Gynecology  Spontaneous Vaginal Delivery Note    Labor & Delivery Summary  Labor Onset Date: 19  Labor Onset Time:     Pre-operative Diagnosis:  Term pregnancy    Post-operative Diagnosis:  Same + live female    Procedure:  Spontaneous vaginal delivery    Surgeon:  Niki Ryan    Information for the patient's :  Ellen Yarbrough [[id number]]          Anesthesia:  epidural anesthesia    Estimated blood loss:  300ml    Specimen:  Placenta not sent to pathology     Cord blood sent No    Complications:  none    Condition:  infant stable to general nursery    Details of Procedure: The patient is a 30 y.o. female at 36w3d   OB History        5    Para   3    Term   3            AB   1    Living   3       SAB   1    TAB        Ectopic        Molar        Multiple        Live Births   3             who was admitted for active phase labor. She received the following interventions: External cephalic version, ARBOW and IV Pitocin induction.   The patient progressed well,did receive an epidural, became complete and started to push. After pushing for 1 time , the fetal head was at the perineum, nose and mouth suctioned with bulb suction and the rest of the infant delivered atraumatically. Cord was clamped and cut and infant was placed on mother abdomen and then to the waiting nurse for evaluation. The delivery of the placenta was spontaneous. The perineum and vagina were explored and  no lacerations were identified.

## 2019-04-12 NOTE — ANESTHESIA PRE PROCEDURE
Department of Anesthesiology  Preprocedure Note       Name:  Naomy Means   Age:  30 y.o.  :  1995                                          MRN:  [id number]         Date:  2019      Surgeon: * No surgeons listed *    Procedure: ANESTHESIA LABOR ANALGESIA    Medications prior to admission:   Prior to Admission medications    Medication Sig Start Date End Date Taking?  Authorizing Provider   ondansetron (ZOFRAN ODT) 4 MG disintegrating tablet Take 4 mg by mouth every 8 hours as needed for Nausea or Vomiting   Yes Historical Provider, MD   diphenhydrAMINE (BENADRYL) 25 MG capsule Take 25 mg by mouth nightly as needed for Itching   Yes Historical Provider, MD       Current medications:    Current Facility-Administered Medications   Medication Dose Route Frequency Provider Last Rate Last Dose    lactated ringers infusion   Intravenous Continuous Niki Ryan  mL/hr at 19      lactated ringers infusion   Intravenous Continuous Stepan Donovan MD        sodium chloride flush 0.9 % injection 10 mL  10 mL Intravenous 2 times per day Niki Ryan MD        sodium chloride flush 0.9 % injection 10 mL  10 mL Intravenous PRN Niki Ryan MD        fentaNYL (SUBLIMAZE) injection 100 mcg  100 mcg Intravenous Q1H PRN Niki Ryan MD        oxytocin (PITOCIN) 30 units in 500 mL infusion  1 gaurang-units/min Intravenous Continuous Niki Ryan MD 10 mL/hr at 19 171 10 gaurang-units/min at 19 171    magnesium hydroxide (MILK OF MAGNESIA) 400 MG/5ML suspension 30 mL  30 mL Oral Daily PRN Niki Ryan MD        [facility]etron [facility]) injection 4 mg  4 mg Intravenous Q6H PRN Niki Ryan MD        famotidine (PEPCID) injection 20 mg  20 mg Intravenous BID PRN Niki Ryan MD        terbutaline (BRETHINE) injection 0.25 mg  0.25 mg Subcutaneous Once Niki Ryan MD        oxytocin (PITOCIN) 30 units in 500 mL infusion  1 gaurang-units/min Intravenous Continuous PRN Anastasiya Chu MD           Allergies:  No Known Allergies    Problem List:    Patient Active Problem List   Diagnosis Code    Hyperemesis arising during pregnancy O21.0    Pyelonephritis complicating pregnancy P25.72    Normal labor and delivery O80    Pregnancy, incidental Z33.1    Pregnancy related condition O26.90    Pregnancy related condition in third trimester O26.93       Past Medical History:        Diagnosis Date    Arthritis     knees    Pyelonephritis complicating pregnancy 70/2/4831       Past Surgical History:        Procedure Laterality Date    APPENDECTOMY      CERVICAL POLYP REMOVAL      uterine polyp       Social History:    Social History     Tobacco Use    Smoking status: Current Every Day Smoker     Packs/day: 0.25     Years: 5.00     Pack years: 1.25     Types: Cigarettes    Smokeless tobacco: Never Used    Tobacco comment: states has tried numerous times but cannot   Substance Use Topics    Alcohol use: No                                Ready to quit: Not Answered  Counseling given: Not Answered  Comment: states has tried numerous times but cannot      Vital Signs (Current):   Vitals:    04/11/19 1952 04/11/19 1954 04/11/19 1957 04/11/19 2000   BP: (!) 143/60 121/73 116/66 115/63   Pulse: 75 75 71 70   Resp: 18 18 18 18   Temp:       TempSrc:       SpO2: 97%      Weight:       Height:                                                  BP Readings from Last 3 Encounters:   04/11/19 115/63   03/28/19 106/65   03/26/19 127/83       NPO Status:                                                                                 BMI:   Wt Readings from Last 3 Encounters:   04/11/19 133 lb (60.3 kg)   03/28/19 134 lb (60.8 kg)   03/26/19 134 lb (60.8 kg)     Body mass index is 23.56 kg/m².     CBC:   Lab Results   Component Value Date    WBC 7.9 04/11/2019    RBC 3.75 04/11/2019    HGB 11.3 04/11/2019 HCT 35.9 04/11/2019    MCV 95.7 04/11/2019    RDW 12.1 04/11/2019     04/11/2019       CMP:   Lab Results   Component Value Date     03/28/2019    K 3.6 03/28/2019    CL 99 03/28/2019    CO2 20 03/28/2019    BUN 8 03/28/2019    CREATININE 0.4 03/28/2019    GFRAA >60 03/28/2019    LABGLOM >60 03/28/2019    GLUCOSE 67 03/28/2019    PROT 5.6 03/28/2019    PROT 6.4 10/09/2012    CALCIUM 7.6 03/28/2019    BILITOT 0.7 03/28/2019    ALKPHOS 163 03/28/2019    AST 30 03/28/2019    ALT 26 03/28/2019       POC Tests: No results for input(s): POCGLU, POCNA, POCK, POCCL, POCBUN, POCHEMO, POCHCT in the last 72 hours. Coags:   Lab Results   Component Value Date    PROTIME 12.7 11/21/2016    INR 1.11 11/21/2016       HCG (If Applicable):   Lab Results   Component Value Date    PREGTESTUR neg 10/30/2015        ABGs: No results found for: PHART, PO2ART, BGA9SNO, WAS3QYV, BEART, R7RSGOPC     Type & Screen (If Applicable):  No results found for: LABABO, [address]    Anesthesia Evaluation  Patient summary reviewed no history of anesthetic complications:   Airway: Mallampati: I  TM distance: >3 FB   Neck ROM: full  Mouth opening: > = 3 FB Dental: normal exam         Pulmonary:normal exam                               Cardiovascular:             Beta Blocker:  Not on Beta Blocker         Neuro/Psych:               GI/Hepatic/Renal:             Endo/Other:                     Abdominal:           Vascular:                                        Anesthesia Plan      epidural     ASA 2       Induction: intravenous. Anesthetic plan and risks discussed with patient.                       Joselo Smith, APRN - CRNA   4/11/2019

## 2019-04-12 NOTE — FLOWSHEET NOTE
Patient taken to MB-02 per w/c with FOB following with belongings on cart and baby in crib. Instructed on room, and call light, voiced understanding.

## 2019-04-13 VITALS
BODY MASS INDEX: 23.57 KG/M2 | RESPIRATION RATE: 18 BRPM | HEIGHT: 63 IN | DIASTOLIC BLOOD PRESSURE: 66 MMHG | OXYGEN SATURATION: 100 % | SYSTOLIC BLOOD PRESSURE: 125 MMHG | HEART RATE: 76 BPM | TEMPERATURE: 98.6 F | WEIGHT: 133 LBS

## 2019-04-13 PROCEDURE — 6370000000 HC RX 637 (ALT 250 FOR IP): Performed by: OBSTETRICS & GYNECOLOGY

## 2019-04-13 RX ORDER — HYDROCODONE BITARTRATE AND ACETAMINOPHEN 5; 325 MG/1; MG/1
1 TABLET ORAL EVERY 6 HOURS PRN
Qty: 20 TABLET | Refills: 0 | Status: SHIPPED | OUTPATIENT
Start: 2019-04-13 | End: 2019-04-16

## 2019-04-13 RX ORDER — IBUPROFEN 800 MG/1
800 TABLET ORAL EVERY 8 HOURS
Qty: 120 TABLET | Refills: 3 | Status: SHIPPED | OUTPATIENT
Start: 2019-04-13

## 2019-04-13 RX ADMIN — IBUPROFEN 800 MG: 200 TABLET, FILM COATED ORAL at 04:58

## 2019-04-13 RX ADMIN — ACETAMINOPHEN 650 MG: 325 TABLET ORAL at 08:21

## 2019-04-13 RX ADMIN — IBUPROFEN 800 MG: 200 TABLET, FILM COATED ORAL at 13:15

## 2019-04-13 RX ADMIN — HYDROCODONE BITARTRATE AND ACETAMINOPHEN 1 TABLET: 5; 325 TABLET ORAL at 00:43

## 2019-04-13 RX ADMIN — DOCUSATE SODIUM 100 MG: 100 CAPSULE, LIQUID FILLED ORAL at 08:21

## 2019-04-13 ASSESSMENT — PAIN SCALES - GENERAL
PAINLEVEL_OUTOF10: 3
PAINLEVEL_OUTOF10: 2
PAINLEVEL_OUTOF10: 3
PAINLEVEL_OUTOF10: 6

## 2019-04-13 NOTE — DISCHARGE SUMMARY
Obstetrical Discharge Form    Gestational Age:  36w3d    Antepartum complications: none    Date of Delivery:   19      Type of Delivery:   vaginal, spontaneous    Delivered By:                 Ana Cancel:       Information for the patient's :  Scooby Novak [[id number]]        Anesthesia:    Epidural    Intrapartum complications: None    Postpartum complications: none    Discharge Date:       Plan:   Follow up    6 weeks  Good condition at discharge

## 2019-04-13 NOTE — PLAN OF CARE
Problem: VAGINAL DELIVERY - RECOVERY AND POST PARTUM  Goal: Vital signs are medically acceptable  Outcome: Met This Shift  Goal: Fundus firm at midline  Outcome: Met This Shift  Goal: Moderate rubra without clots, no purulent discharge, no foul smelling lochia  Outcome: Met This Shift  Goal: Empties bladder  Outcome: Met This Shift  Goal: Verbalizes understanding of normal bowel function resumption  Outcome: Met This Shift  Goal: Edema will be absent or minimal  Outcome: Met This Shift  Goal: Breasts are soft with nipple integrity intact  Outcome: Met This Shift  Goal: Demonstrates appropriate breast feeding techniques  Outcome: Met This Shift  Goal: Appropriate behavior observed  Outcome: Met This Shift  Goal: Perineum intact without discharge or hematoma  Outcome: Met This Shift     Problem: KNOWLEDGE DEFICIT  Goal: Patient/S.O. demonstrates understanding of disease process, treatment plan, medications, and discharge instructions.   Outcome: Met This Shift

## 2019-04-13 NOTE — FLOWSHEET NOTE
ID bands checked. Infant's ID band removed and stapled to footprint sheet, the mother verified as correct, signed and witnessed by RN. Hugs tag removed. Discharge instructions given and reviewed. Rx's given. Mother understands to return to Office Dr Deway[facility] for 6 week routine check up. Mother states she has safe crib for baby at home and has signed [employer]" paperwork. Ambulating well at discharge with pain #0. Father of baby driving mother and baby home. Mother verbalizes understanding to follow-up with Pediatric Provider, Dr Cristopher Prieto in 3 days at office for baby's first office visit. Baby pink, without distress,  harnessed into carseat at discharge. Extra diapers given per request to mother and hard shells for inside bra. Nursing well  With deep grasp obtained at discharge.

## 2019-04-13 NOTE — FLOWSHEET NOTE
AM assessment complete. Bilateral breath sounds clear on auscultation. Pt states she smokes 1/2 pack per day. Pt refused smoking cessation. Abdomen soft, fundus firm with little rubra. Discussed plan of care. Pt talkative. Showered independently. Baby in open crib with father of baby at bedside.

## 2021-12-26 ENCOUNTER — HOSPITAL ENCOUNTER (EMERGENCY)
Age: 26
Discharge: LEFT BEFORE DISCHARGE | End: 2021-12-26

## 2021-12-26 ENCOUNTER — APPOINTMENT (OUTPATIENT)
Dept: GENERAL RADIOLOGY | Age: 26
End: 2021-12-26
Payer: COMMERCIAL

## 2021-12-26 ENCOUNTER — HOSPITAL ENCOUNTER (EMERGENCY)
Age: 26
Discharge: HOME | End: 2021-12-26
Attending: EMERGENCY MEDICINE
Payer: COMMERCIAL

## 2021-12-26 VITALS
RESPIRATION RATE: 18 BRPM | HEIGHT: 63 IN | TEMPERATURE: 98.4 F | WEIGHT: 135 LBS | BODY MASS INDEX: 23.92 KG/M2 | HEART RATE: 80 BPM | SYSTOLIC BLOOD PRESSURE: 125 MMHG | OXYGEN SATURATION: 100 % | DIASTOLIC BLOOD PRESSURE: 81 MMHG

## 2021-12-26 DIAGNOSIS — J06.9 UPPER RESPIRATORY TRACT INFECTION, UNSPECIFIED TYPE: Primary | ICD-10-CM

## 2021-12-26 DIAGNOSIS — J02.9 ACUTE PHARYNGITIS, UNSPECIFIED ETIOLOGY: ICD-10-CM

## 2021-12-26 LAB
SARS-COV-2: NOT DETECTED
SOURCE: NORMAL

## 2021-12-26 PROCEDURE — 6370000000 HC RX 637 (ALT 250 FOR IP): Performed by: EMERGENCY MEDICINE

## 2021-12-26 PROCEDURE — 94640 AIRWAY INHALATION TREATMENT: CPT

## 2021-12-26 PROCEDURE — 71045 X-RAY EXAM CHEST 1 VIEW: CPT

## 2021-12-26 PROCEDURE — 99283 EMERGENCY DEPT VISIT LOW MDM: CPT

## 2021-12-26 PROCEDURE — U0005 INFEC AGEN DETEC AMPLI PROBE: HCPCS

## 2021-12-26 PROCEDURE — U0003 INFECTIOUS AGENT DETECTION BY NUCLEIC ACID (DNA OR RNA); SEVERE ACUTE RESPIRATORY SYNDROME CORONAVIRUS 2 (SARS-COV-2) (CORONAVIRUS DISEASE [COVID-19]), AMPLIFIED PROBE TECHNIQUE, MAKING USE OF HIGH THROUGHPUT TECHNOLOGIES AS DESCRIBED BY CMS-2020-01-R: HCPCS

## 2021-12-26 RX ORDER — AZITHROMYCIN 250 MG/1
TABLET, FILM COATED ORAL
Qty: 1 PACKET | Refills: 0 | Status: SHIPPED | OUTPATIENT
Start: 2021-12-26

## 2021-12-26 RX ORDER — BENZONATATE 100 MG/1
200 CAPSULE ORAL 3 TIMES DAILY PRN
Qty: 21 CAPSULE | Refills: 0 | Status: SHIPPED | OUTPATIENT
Start: 2021-12-26 | End: 2022-01-02

## 2021-12-26 RX ORDER — BENZONATATE 100 MG/1
200 CAPSULE ORAL ONCE
Status: COMPLETED | OUTPATIENT
Start: 2021-12-26 | End: 2021-12-26

## 2021-12-26 RX ORDER — ALBUTEROL SULFATE 90 UG/1
2 AEROSOL, METERED RESPIRATORY (INHALATION) ONCE
Status: COMPLETED | OUTPATIENT
Start: 2021-12-26 | End: 2021-12-26

## 2021-12-26 RX ORDER — DEXTROMETHORPHAN HYDROBROMIDE AND PROMETHAZINE HYDROCHLORIDE 15; 6.25 MG/5ML; MG/5ML
5 SYRUP ORAL 4 TIMES DAILY PRN
Qty: 120 ML | Refills: 0 | Status: SHIPPED | OUTPATIENT
Start: 2021-12-26 | End: 2022-01-02

## 2021-12-26 RX ORDER — ALBUTEROL SULFATE 90 UG/1
AEROSOL, METERED RESPIRATORY (INHALATION)
Status: DISCONTINUED
Start: 2021-12-26 | End: 2021-12-26 | Stop reason: HOSPADM

## 2021-12-26 RX ADMIN — BENZONATATE 200 MG: 100 CAPSULE ORAL at 07:28

## 2021-12-26 RX ADMIN — ALBUTEROL SULFATE 2 PUFF: 90 AEROSOL, METERED RESPIRATORY (INHALATION) at 08:15

## 2021-12-26 ASSESSMENT — PAIN DESCRIPTION - DESCRIPTORS
DESCRIPTORS: PRESSURE
DESCRIPTORS: ACHING;DISCOMFORT

## 2021-12-26 ASSESSMENT — PAIN SCALES - GENERAL
PAINLEVEL_OUTOF10: 7
PAINLEVEL_OUTOF10: 7

## 2021-12-26 ASSESSMENT — PAIN DESCRIPTION - PAIN TYPE
TYPE: ACUTE PAIN
TYPE: ACUTE PAIN

## 2021-12-26 ASSESSMENT — PAIN DESCRIPTION - LOCATION
LOCATION: CHEST
LOCATION: CHEST

## 2021-12-26 ASSESSMENT — PAIN DESCRIPTION - ORIENTATION
ORIENTATION: UPPER
ORIENTATION: MID

## 2021-12-26 ASSESSMENT — PAIN DESCRIPTION - FREQUENCY
FREQUENCY: INTERMITTENT
FREQUENCY: INTERMITTENT

## 2021-12-26 NOTE — ED PROVIDER NOTES
Emergency Department Encounter  Location: [address]    Patient: Flavio Edwards  MRN: [id number]  : 1995  Date of evaluation: 2021  ED Provider: Tigist Long DO, FACEP    Chief Complaint:    Cough (C/O NON PROD. COUGH FOR 3 DAYS)    [place]:  Flavio Edwards is a 27 y.o. female that presents to the emergency department with complaints of cough and congestion with chest discomfort and some nausea that started 3 or 4 days ago. Patient denies fever. She denies vomiting or diarrhea. Patient was tested for Covid about a week ago but did not have the symptoms. She states she was tested for her job. It was negative. She denies fever or chills. She is complaining of a slight sore throat which she states has improved some this morning. She states she feels she has congestion in her chest that she is just not able to cough up. She has a history of asthma as a child but outgrew that. She is not taking any medication other than some over-the-counter decongestant and sinus medication. She denies pregnancy at this time    ROS:  At least 4 systems reviewed and otherwise acutely negative except as in the [address]   Negative for fever or chills  Negative for chest pain  Negative for shortness of breath  Positive for nausea negative for vomiting diarrhea or constipation    Past Medical History:   Diagnosis Date    Arthritis     knees    Pyelonephritis complicating pregnancy      Past Surgical History:   Procedure Laterality Date    APPENDECTOMY      CERVICAL POLYP REMOVAL      uterine polyp     Family History   Problem Relation Age of Onset    Cancer Father     Early Death Father     Other Brother         hersprungs disease    Allergy (Severe) Brother     Anemia Brother     Asthma Brother     Birth Defects Brother     Arthritis Mother     Depression Mother     Miscarriages / [place] Mother     Depression Maternal Aunt     Substance Abuse Maternal Aunt  Substance Abuse Maternal Uncle     Early Death Maternal Uncle     Alcohol Abuse Maternal Grandmother     Arthritis Maternal Grandmother     Asthma Maternal Grandmother     Depression Maternal Grandmother     High Blood Pressure Maternal Grandmother     Miscarriages / Stillbirths Maternal Grandmother     Substance Abuse Maternal Grandmother     Alcohol Abuse Maternal Grandfather     Arthritis Maternal Grandfather     Asthma Maternal Grandfather     Coronary Art Dis Maternal Grandfather     Heart Attack Maternal Grandfather     Heart Disease Maternal Grandfather     Stroke Maternal Grandfather     Substance Abuse Maternal Grandfather     Alcohol Abuse Paternal Grandmother     Arthritis Paternal Grandmother     Asthma Paternal Grandmother     Arthritis Paternal Grandfather     Asthma Paternal Grandfather      Social History     Socioeconomic History    Marital status: Single     Spouse name: Not on file    Number of children: Not on file    Years of education: Not on file    Highest education level: Not on file   Occupational History    Not on file   Tobacco Use    Smoking status: Former Smoker     Packs/day: 0.25     Years: 5.00     Pack years: 1.25     Types: Cigarettes     Quit date: 10/26/2021     Years since quittin.1    Smokeless tobacco: Never Used    Tobacco comment: states has tried numerous times but cannot   Vaping Use    Vaping Use: Every day    Start date: 3/1/2017    Last attempt to quit: 2018    Substances: Nicotine   Substance and Sexual Activity    Alcohol use:  Yes    Drug use: No    Sexual activity: Yes     Partners: Male     Comment: 3/25/19   Other Topics Concern    Not on file   Social History Narrative    Not on file     Social Determinants of Health     Financial Resource Strain:     Difficulty of Paying Living Expenses: Not on file   Food Insecurity:     Worried About [address] in the Last Year: Not on file    [address] in the Last Year: Not on file   Transportation Needs:     Lack of Transportation (Medical): Not on file    Lack of Transportation (Non-Medical): Not on file   Physical Activity:     Days of Exercise per Week: Not on file    Minutes of Exercise per Session: Not on file   Stress:     Feeling of Stress : Not on file   Social Connections:     Frequency of Communication with Friends and Family: Not on file    Frequency of Social Gatherings with Friends and Family: Not on file    Attends Taoism Services: Not on file    Active Member of [address] [place] [address] [employer] or Organizations: Not on file    Attends Club or Organization Meetings: Not on file    Marital Status: Not on file   Intimate Partner Violence:     Fear of Current or Ex-Partner: Not on file    Emotionally Abused: Not on file    Physically Abused: Not on file    Sexually Abused: Not on file   Housing Stability:     Unable to Pay for Housing in the Last Year: Not on file    Number of [place] in the Last Year: Not on file    Unstable Housing in the Last Year: Not on file     No current facility-administered medications for this encounter.      Current Outpatient Medications   Medication Sig Dispense Refill    azithromycin (ZITHROMAX) 250 MG tablet Use as directed 1 packet 0    benzonatate (TESSALON PERLES) 100 MG capsule Take 2 capsules by mouth 3 times daily as needed for Cough 21 capsule 0    promethazine-dextromethorphan (PROMETHAZINE-DM) 6.25-15 MG/5ML syrup Take 5 mLs by mouth 4 times daily as needed for Cough 120 mL 0    ibuprofen (ADVIL;MOTRIN) 800 MG tablet Take 1 tablet by mouth every 8 hours 120 tablet 3    ondansetron (ZOFRAN ODT) 4 MG disintegrating tablet Take 4 mg by mouth every 8 hours as needed for Nausea or Vomiting      diphenhydrAMINE (BENADRYL) 25 MG capsule Take 25 mg by mouth nightly as needed for Itching       No Known Allergies  Nursing Notes Reviewed    Physical Exam:  ED Triage Vitals [12/26/21 0709]   Enc Vitals Group      /81 Pulse 80      Resp 18      Temp 98.4 °F (36.9 °C)      Temp Source Oral      SpO2 100 %      Weight 135 lb (61.2 kg)      Height 5' 3\" (1.6 m)      Head Circumference       Peak Flow       Pain Score       Pain Loc       Pain Edu? Excl. in [address]? GENERAL APPEARANCE: Awake and alert. Cooperative. No acute distress. Nontoxic in appearance  HEAD: Normocephalic. Atraumatic. EYES: Sclera anicteric. ENT: Tolerates saliva. Tympanic membranes clear bilaterally. Pharynx is erythematous with enlarged tonsils bilaterally. Posterior pharyngeal drainage is noted  NECK: Supple. Trachea midline. No meningeal signs  LUNGS: Respirations unlabored. Mild end expiratory wheezes bilaterally. EXTREMITIES: No acute deformities. SKIN: Warm and dry. NEUROLOGICAL: No gross facial drooping. PSYCHIATRIC: Normal mood. Labs:  No results found for this visit on 12/26/21. Radiographs (if obtained):  [] The following radiograph was interpreted by myself in the absence of a radiologist:  [x] Radiologist's Report reviewed at time of ED visit:  XR CHEST PORTABLE   Final Result   No acute cardiopulmonary abnormality. ED Course and MDM:  Here in the ER the patient had Tessalon Perles and albuterol inhaler. Her throat does look as if it could be a bacterial infection so Zithromax will be started. She is also tested for Covid. She is off work till negative Covid test is returned. She will be started on an outpatient course of Zithromax Tessalon Perles and Phenergan DM. She is instructed to follow-up with her primary caregiver in 1 week. She is instructed to use inhaler 2 puffs every 4 hours as needed and to return to the emergency department for any problems or concerns. Final Impression:  1. Upper respiratory tract infection, unspecified type    2.  Acute pharyngitis, unspecified etiology      DISPOSITION Decision To Discharge    Patient referred to:  [address]  [address]

## 2021-12-26 NOTE — Clinical Note
Mariela Mares was seen and treated in our emergency department on 12/26/2021. She may return to work on 12/29/2021. May return to work if Covid test is negative. Otherwise if Covid test is positive may return to work may return to work January 9, 2022     If you have any questions or concerns, please don't hesitate to call.       Leatha Ordonez, DO

## 2021-12-26 NOTE — ED NOTES
Pt to triage desk asking \"is there another hospital anywhere close to here? \" Pt states she is leaving and ignored this RN and walked away when asked to sign out left without being seen. Pt no longer in this department.       Ramon Laird RN  12/26/21 7886

## 2023-01-18 ENCOUNTER — HOSPITAL ENCOUNTER (EMERGENCY)
Age: 28
Discharge: HOME | End: 2023-01-18
Attending: EMERGENCY MEDICINE
Payer: COMMERCIAL

## 2023-01-18 VITALS
HEART RATE: 89 BPM | TEMPERATURE: 97.8 F | SYSTOLIC BLOOD PRESSURE: 133 MMHG | RESPIRATION RATE: 16 BRPM | DIASTOLIC BLOOD PRESSURE: 90 MMHG | OXYGEN SATURATION: 100 %

## 2023-01-18 DIAGNOSIS — J02.0 STREP PHARYNGITIS: Primary | ICD-10-CM

## 2023-01-18 PROCEDURE — 87430 STREP A AG IA: CPT

## 2023-01-18 PROCEDURE — 99283 EMERGENCY DEPT VISIT LOW MDM: CPT

## 2023-01-18 PROCEDURE — 6360000002 HC RX W HCPCS: Performed by: EMERGENCY MEDICINE

## 2023-01-18 PROCEDURE — 87077 CULTURE AEROBIC IDENTIFY: CPT

## 2023-01-18 PROCEDURE — 6370000000 HC RX 637 (ALT 250 FOR IP): Performed by: EMERGENCY MEDICINE

## 2023-01-18 PROCEDURE — 87081 CULTURE SCREEN ONLY: CPT

## 2023-01-18 RX ORDER — NAPROXEN 250 MG/1
500 TABLET ORAL ONCE
Status: COMPLETED | OUTPATIENT
Start: 2023-01-18 | End: 2023-01-18

## 2023-01-18 RX ORDER — AMOXICILLIN 500 MG/1
500 CAPSULE ORAL 2 TIMES DAILY
Qty: 14 CAPSULE | Refills: 0 | Status: SHIPPED | OUTPATIENT
Start: 2023-01-18 | End: 2023-01-25

## 2023-01-18 RX ORDER — DEXAMETHASONE 4 MG/1
10 TABLET ORAL ONCE
Status: COMPLETED | OUTPATIENT
Start: 2023-01-18 | End: 2023-01-18

## 2023-01-18 RX ADMIN — DEXAMETHASONE 10 MG: 4 TABLET ORAL at 05:36

## 2023-01-18 RX ADMIN — NAPROXEN 500 MG: 250 TABLET ORAL at 05:35

## 2023-01-18 ASSESSMENT — PAIN DESCRIPTION - LOCATION: LOCATION: THROAT

## 2023-01-18 ASSESSMENT — PAIN SCALES - GENERAL: PAINLEVEL_OUTOF10: 7

## 2023-01-18 NOTE — ED NOTES
Discharge packet reviewed with pt, including prescription. Pt verbalized understanding and denies questions.       Mariluz Reyes RN  01/18/23 7597

## 2023-01-18 NOTE — ED PROVIDER NOTES
[facility] Chief Complaint:   Oral Swelling and Pharyngitis    [place]:  Alida Dixon is a 32 y.o. female that presents with sore throat. The patient states that over the last 2 days she has had worsening sore throat, pain with swallowing, feeling of difficulty breathing developing overnight. Denies any known fevers. She has had minimal cough. Patient able to tolerate secretions. No current vomiting or diarrhea. ROS:  At least 6 systems reviewed and otherwise acutely negative except as in the [address]     Past Medical History:   Diagnosis Date    Arthritis     knees    Pyelonephritis complicating pregnancy 60/4/[birth year]62     Past Surgical History:   Procedure Laterality Date    APPENDECTOMY      CERVICAL POLYP REMOVAL      uterine polyp     Family History   Problem Relation Age of Onset    Cancer Father     Early Death Father     Other Brother         hersprungs disease    Allergy (Severe) Brother     Anemia Brother     Asthma Brother     Birth Defects Brother     Arthritis Mother     Depression Mother     Miscarriages / Stillbirths Mother     Depression Maternal Aunt     Substance Abuse Maternal Aunt     Substance Abuse Maternal Uncle     Early Death Maternal Uncle     Alcohol Abuse Maternal Grandmother     Arthritis Maternal Grandmother     Asthma Maternal Grandmother     Depression Maternal Grandmother     High Blood Pressure Maternal Grandmother     Miscarriages / Stillbirths Maternal Grandmother     Substance Abuse Maternal Grandmother     Alcohol Abuse Maternal Grandfather     Arthritis Maternal Grandfather     Asthma Maternal Grandfather     Coronary Art Dis Maternal Grandfather     Heart Attack Maternal Grandfather     Heart Disease Maternal Grandfather     Stroke Maternal Grandfather     Substance Abuse Maternal Grandfather     Alcohol Abuse Paternal Grandmother     Arthritis Paternal Grandmother     Asthma Paternal Grandmother     Arthritis Paternal Grandfather     Asthma Paternal Grandfather      Social History     Socioeconomic History    Marital status: Single     Spouse name: Not on file    Number of children: Not on file    Years of education: Not on file    Highest education level: Not on file   Occupational History    Not on file   Tobacco Use    Smoking status: Every Day     Packs/day: 1.00     Years: 10.00     Pack years: 10.00     Types: Cigarettes     Last attempt to quit: 10/26/2021     Years since quittin.2    Smokeless tobacco: Never    Tobacco comments:     states has tried numerous times but cannot   Vaping Use    Vaping Use: Every day    Start date: 3/1/2017    Last attempt to quit: 2018    Substances: Nicotine   Substance and Sexual Activity    Alcohol use: Yes    Drug use: Yes     Frequency: 7.0 times per week     Types: Marijuana Silverio Divine)    Sexual activity: Yes     Partners: Male     Comment: 3/25/19   Other Topics Concern    Not on file   Social History Narrative    Not on file     Social Determinants of Health     Financial Resource Strain: Not on file   Food Insecurity: Not on file   Transportation Needs: Not on file   Physical Activity: Not on file   Stress: Not on file   Social Connections: Not on file   Intimate Partner Violence: Not on file   Housing Stability: Not on file     No current facility-administered medications for this encounter.      Current Outpatient Medications   Medication Sig Dispense Refill    amoxicillin (AMOXIL) 500 MG capsule Take 1 capsule by mouth 2 times daily for 7 days 14 capsule 0    azithromycin (ZITHROMAX) 250 MG tablet Use as directed 1 packet 0    ibuprofen (ADVIL;MOTRIN) 800 MG tablet Take 1 tablet by mouth every 8 hours 120 tablet 3    ondansetron (ZOFRAN ODT) 4 MG disintegrating tablet Take 4 mg by mouth every 8 hours as needed for Nausea or Vomiting      diphenhydrAMINE (BENADRYL) 25 MG capsule Take 25 mg by mouth nightly as needed for Itching       No Known Allergies    Nursing Notes Reviewed    Physical Exam:  ED Triage Vitals [23 0500] Enc Vitals Group      BP (!) 133/90      Heart Rate (!) 124      Resp 16      Temp 97.8 °F (36.6 °C)      Temp Source Oral      SpO2 100 %      Weight       Height       Head Circumference       Peak Flow       Pain Score       Pain Loc       Pain Edu? Excl. in [address]? GENERAL APPEARANCE: Awake and alert. Cooperative. No acute distress. HEENT: Head NC/AT, EOM's grossly intact. Conjunctiva anicteric. Mucous membranes moist. Tolerates saliva. No trismus. Neck supple. Trachea midline. Anterior cervical lymphadenopathy. No stridor or drooling. Bilateral posterior pharyngeal erythema with tonsillar hypertrophy and exudates without asymmetry. No uvular deviation or palatal asymmetry. No voice hoarseness      I have reviewed and interpreted all of the currently available lab results from this visit (if applicable):  Results for orders placed or performed during the hospital encounter of 01/18/23   Strep Screen Group A Throat    Specimen: Throat   Result Value Ref Range    Specimen THROAT     Special Requests NONE     Strep A Direct Screen POSITIVE     Culture NO CULTURE NEEDED ON POSITIVE SCREEN       Radiographs (if obtained):  [] The following radiograph was interpreted by myself in the absence of a radiologist:  [] Radiologist's Report Reviewed:    Medical Decision Making and ED Course:    CC/HPI Summary, DDx, ED Course, and Reassessment: Patient presents as above with signs and symptoms consistent with acute pharyngitis. Patient without any stridor, drooling, voice changes. She is able to tolerate secretions. No clinical evidence of PTA, retropharyngeal abscess, Isaiah angina or other impending acute airway obstructive process. Patient given naproxen and Decadron here for symptoms. Strep screen obtained and positive. Patient discharged with amoxicillin.     History from : Patient    Limitations to history : None    Patient was given the following medications:  Medications   dexamethasone (DECADRON) tablet 10 mg (10 mg Oral Given 1/18/23 0536)   naproxen (NAPROSYN) tablet 500 mg (500 mg Oral Given 1/18/23 0535)       Independent Imaging Interpretation by me:     EKG (if obtained): (All EKG's are interpreted by myself in the absence of a cardiologist)     Chronic conditions affecting care: none    Discussion with Other Profesionals : None    Social Determinants : None    Records Reviewed : None    Disposition Considerations (tests considered but not done, Shared Decision Making, Pt Expectation of Test or Tx.): none    Appropriate for outpatient management and PCP followup    I am the Primary Clinician of Record. Clinical Impression:  1.  Strep pharyngitis      (Please note that portions of this note may have been completed with a voice recognition program. Efforts were made to edit the dictations but occasionally words are mis-transcribed.)    MD David Simpson MD  01/18/23 5506

## 2023-01-18 NOTE — LETTER
[facility] Emergency Department  [address] [address]  Phone: [phone]  Fax: [phone]             January 18, 2023    Patient: Shalom Moralez   YOB: 1995   Date of Visit: 1/18/2023       To Whom It May Concern:    Fidelia Alves was seen and treated in our emergency department on 1/18/2023. She may return to work on 1/21/23 or sooner if symptoms resolve.       Sincerely,             Signature:__________________________________

## 2023-01-19 LAB
CULTURE: ABNORMAL
CULTURE: ABNORMAL
Lab: ABNORMAL
SPECIMEN: ABNORMAL
STREP A DIRECT SCREEN: POSITIVE

## 2023-10-26 ENCOUNTER — APPOINTMENT (OUTPATIENT)
Dept: CT IMAGING | Age: 28
End: 2023-10-26
Payer: COMMERCIAL

## 2023-10-26 ENCOUNTER — HOSPITAL ENCOUNTER (EMERGENCY)
Age: 28
Discharge: HOME | End: 2023-10-27
Attending: EMERGENCY MEDICINE
Payer: COMMERCIAL

## 2023-10-26 DIAGNOSIS — R10.13 ABDOMINAL PAIN, EPIGASTRIC: Primary | ICD-10-CM

## 2023-10-26 LAB
ALBUMIN SERPL-MCNC: 4.2 GM/DL (ref 3.4–5)
ALP BLD-CCNC: 53 IU/L (ref 40–129)
ALT SERPL-CCNC: 21 U/L (ref 10–40)
ANION GAP SERPL CALCULATED.3IONS-SCNC: 11 MMOL/L (ref 4–16)
AST SERPL-CCNC: 23 IU/L (ref 15–37)
BASOPHILS ABSOLUTE: 0.1 K/CU MM
BASOPHILS RELATIVE PERCENT: 0.8 % (ref 0–1)
BILIRUB SERPL-MCNC: 0.2 MG/DL (ref 0–1)
BILIRUBIN URINE: NEGATIVE MG/DL
BLOOD, URINE: NEGATIVE
BUN SERPL-MCNC: 10 MG/DL (ref 6–23)
CALCIUM SERPL-MCNC: 8.9 MG/DL (ref 8.3–10.6)
CHLORIDE BLD-SCNC: 106 MMOL/L (ref 99–110)
CLARITY: CLEAR
CO2: 22 MMOL/L (ref 21–32)
COLOR: YELLOW
COMMENT UA: NORMAL
CREAT SERPL-MCNC: 0.7 MG/DL (ref 0.6–1.1)
DIFFERENTIAL TYPE: ABNORMAL
EOSINOPHILS ABSOLUTE: 0.2 K/CU MM
EOSINOPHILS RELATIVE PERCENT: 2.7 % (ref 0–3)
GFR SERPL CREATININE-BSD FRML MDRD: >60 ML/MIN/1.73M2
GLUCOSE SERPL-MCNC: 101 MG/DL (ref 70–99)
GLUCOSE, URINE: NEGATIVE MG/DL
HCT VFR BLD CALC: 37.8 % (ref 37–47)
HEMOGLOBIN: 12.7 GM/DL (ref 12.5–16)
IMMATURE NEUTROPHIL %: 0.3 % (ref 0–0.43)
INTERPRETATION: NORMAL
KETONES, URINE: NEGATIVE MG/DL
LEUKOCYTE ESTERASE, URINE: NEGATIVE
LIPASE: 46 IU/L (ref 13–60)
LYMPHOCYTES ABSOLUTE: 3.6 K/CU MM
LYMPHOCYTES RELATIVE PERCENT: 49.4 % (ref 24–44)
MCH RBC QN AUTO: 31.1 PG (ref 27–31)
MCHC RBC AUTO-ENTMCNC: 33.6 % (ref 32–36)
MCV RBC AUTO: 92.4 FL (ref 78–100)
MONOCYTES ABSOLUTE: 0.5 K/CU MM
MONOCYTES RELATIVE PERCENT: 6.3 % (ref 0–4)
NITRITE URINE, QUANTITATIVE: NEGATIVE
NUCLEATED RBC %: 0 %
PDW BLD-RTO: 11.7 % (ref 11.7–14.9)
PH, URINE: 6.5 (ref 5–8)
PLATELET # BLD: 227 K/CU MM (ref 140–440)
PMV BLD AUTO: 9.1 FL (ref 7.5–11.1)
POTASSIUM SERPL-SCNC: 4.1 MMOL/L (ref 3.5–5.1)
PREGNANCY, URINE: NEGATIVE
PROTEIN UA: NEGATIVE MG/DL
RBC # BLD: 4.09 M/CU MM (ref 4.2–5.4)
SEGMENTED NEUTROPHILS ABSOLUTE COUNT: 3 K/CU MM
SEGMENTED NEUTROPHILS RELATIVE PERCENT: 40.5 % (ref 36–66)
SODIUM BLD-SCNC: 139 MMOL/L (ref 135–145)
SPECIFIC GRAVITY UA: 1.02 (ref 1–1.03)
TOTAL IMMATURE NEUTOROPHIL: 0.02 K/CU MM
TOTAL NUCLEATED RBC: 0 K/CU MM
TOTAL PROTEIN: 6.4 GM/DL (ref 6.4–8.2)
UROBILINOGEN, URINE: 0.2 MG/DL (ref 0.2–1)
WBC # BLD: 7.3 K/CU MM (ref 4–10.5)

## 2023-10-26 PROCEDURE — 80053 COMPREHEN METABOLIC PANEL: CPT

## 2023-10-26 PROCEDURE — 85025 COMPLETE CBC W/AUTO DIFF WBC: CPT

## 2023-10-26 PROCEDURE — 6360000004 HC RX CONTRAST MEDICATION: Performed by: EMERGENCY MEDICINE

## 2023-10-26 PROCEDURE — 81003 URINALYSIS AUTO W/O SCOPE: CPT

## 2023-10-26 PROCEDURE — 99285 EMERGENCY DEPT VISIT HI MDM: CPT

## 2023-10-26 PROCEDURE — 6360000002 HC RX W HCPCS: Performed by: EMERGENCY MEDICINE

## 2023-10-26 PROCEDURE — 83690 ASSAY OF LIPASE: CPT

## 2023-10-26 PROCEDURE — 6370000000 HC RX 637 (ALT 250 FOR IP): Performed by: EMERGENCY MEDICINE

## 2023-10-26 PROCEDURE — 74177 CT ABD & PELVIS W/CONTRAST: CPT

## 2023-10-26 PROCEDURE — 96374 THER/PROPH/DIAG INJ IV PUSH: CPT

## 2023-10-26 PROCEDURE — 81025 URINE PREGNANCY TEST: CPT

## 2023-10-26 PROCEDURE — 96375 TX/PRO/DX INJ NEW DRUG ADDON: CPT

## 2023-10-26 RX ORDER — KETOROLAC TROMETHAMINE 15 MG/ML
30 INJECTION, SOLUTION INTRAMUSCULAR; INTRAVENOUS ONCE
Status: COMPLETED | OUTPATIENT
Start: 2023-10-26 | End: 2023-10-26

## 2023-10-26 RX ORDER — MAGNESIUM HYDROXIDE/ALUMINUM HYDROXICE/SIMETHICONE 120; 1200; 1200 MG/30ML; MG/30ML; MG/30ML
30 SUSPENSION ORAL ONCE
Status: COMPLETED | OUTPATIENT
Start: 2023-10-26 | End: 2023-10-26

## 2023-10-26 RX ORDER — FAMOTIDINE 20 MG/1
20 TABLET, FILM COATED ORAL ONCE
Status: COMPLETED | OUTPATIENT
Start: 2023-10-26 | End: 2023-10-26

## 2023-10-26 RX ORDER — ONDANSETRON 2 MG/ML
8 INJECTION INTRAMUSCULAR; INTRAVENOUS ONCE
Status: COMPLETED | OUTPATIENT
Start: 2023-10-26 | End: 2023-10-26

## 2023-10-26 RX ADMIN — IOPAMIDOL 75 ML: 755 INJECTION, SOLUTION INTRAVENOUS at 22:49

## 2023-10-26 RX ADMIN — ONDANSETRON 8 MG: 2 INJECTION INTRAMUSCULAR; INTRAVENOUS at 22:34

## 2023-10-26 RX ADMIN — KETOROLAC TROMETHAMINE 30 MG: 15 INJECTION, SOLUTION INTRAMUSCULAR; INTRAVENOUS at 22:34

## 2023-10-26 RX ADMIN — ALUMINUM HYDROXIDE, MAGNESIUM HYDROXIDE, AND SIMETHICONE 30 ML: 200; 200; 20 SUSPENSION ORAL at 22:34

## 2023-10-26 RX ADMIN — FAMOTIDINE 20 MG: 20 TABLET ORAL at 22:33

## 2023-10-26 ASSESSMENT — PAIN SCALES - GENERAL: PAINLEVEL_OUTOF10: 7

## 2023-10-27 VITALS
RESPIRATION RATE: 18 BRPM | OXYGEN SATURATION: 97 % | DIASTOLIC BLOOD PRESSURE: 64 MMHG | HEART RATE: 71 BPM | HEIGHT: 63 IN | SYSTOLIC BLOOD PRESSURE: 101 MMHG | BODY MASS INDEX: 22.68 KG/M2 | TEMPERATURE: 97.8 F | WEIGHT: 128 LBS

## 2023-10-27 RX ORDER — OMEPRAZOLE 20 MG/1
20 CAPSULE, DELAYED RELEASE ORAL
Qty: 30 CAPSULE | Refills: 0 | Status: SHIPPED | OUTPATIENT
Start: 2023-10-27

## 2023-10-27 RX ORDER — ONDANSETRON 4 MG/1
4 TABLET, FILM COATED ORAL 3 TIMES DAILY PRN
Qty: 15 TABLET | Refills: 0 | Status: SHIPPED | OUTPATIENT
Start: 2023-10-27

## 2023-10-27 NOTE — ED PROVIDER NOTES
Arthritis Paternal Grandfather     Asthma Paternal Grandfather      Social History     Socioeconomic History    Marital status: Single     Spouse name: Not on file    Number of children: Not on file    Years of education: Not on file    Highest education level: Not on file   Occupational History    Not on file   Tobacco Use    Smoking status: Every Day     Packs/day: 1.00     Years: 10.00     Additional pack years: 0.00     Total pack years: 10.00     Types: Cigarettes     Last attempt to quit: 10/26/2021     Years since quittin.0    Smokeless tobacco: Never    Tobacco comments:     states has tried numerous times but cannot   Vaping Use    Vaping Use: Every day    Start date: 3/1/2017    Last attempt to quit: 2018    Substances: Nicotine   Substance and Sexual Activity    Alcohol use: Yes    Drug use: Yes     Frequency: 7.0 times per week     Types: Marijuana Oswaldo Mater)    Sexual activity: Yes     Partners: Male     Comment: 3/25/19   Other Topics Concern    Not on file   Social History Narrative    Not on file     Social Determinants of Health     Financial Resource Strain: Not on file   Food Insecurity: Not on file   Transportation Needs: Not on file   Physical Activity: Not on file   Stress: Not on file   Social Connections: Not on file   Intimate Partner Violence: Not on file   Housing Stability: Not on file     No current facility-administered medications for this encounter.      Current Outpatient Medications   Medication Sig Dispense Refill    omeprazole (PRILOSEC) 20 MG delayed release capsule Take 1 capsule by mouth every morning (before breakfast) 30 capsule 0    ondansetron (ZOFRAN) 4 MG tablet Take 1 tablet by mouth 3 times daily as needed for Nausea or Vomiting 15 tablet 0    azithromycin (ZITHROMAX) 250 MG tablet Use as directed 1 packet 0    ibuprofen (ADVIL;MOTRIN) 800 MG tablet Take 1 tablet by mouth every 8 hours 120 tablet 3    ondansetron (ZOFRAN ODT) 4 MG disintegrating tablet Take 4 mg by

## 2024-07-17 ENCOUNTER — OFFICE VISIT (OUTPATIENT)
Dept: OBGYN | Age: 29
End: 2024-07-17
Payer: COMMERCIAL

## 2024-07-17 ENCOUNTER — HOSPITAL ENCOUNTER (OUTPATIENT)
Age: 29
Setting detail: SPECIMEN
Discharge: HOME | End: 2024-07-17
Payer: COMMERCIAL

## 2024-07-17 VITALS
HEIGHT: 63 IN | DIASTOLIC BLOOD PRESSURE: 76 MMHG | BODY MASS INDEX: 22.5 KG/M2 | SYSTOLIC BLOOD PRESSURE: 110 MMHG | WEIGHT: 127 LBS

## 2024-07-17 DIAGNOSIS — Z01.419 WOMEN'S ANNUAL ROUTINE GYNECOLOGICAL EXAMINATION: Primary | ICD-10-CM

## 2024-07-17 DIAGNOSIS — N60.11 FIBROCYSTIC BREAST CHANGES, RIGHT: ICD-10-CM

## 2024-07-17 DIAGNOSIS — N94.6 DYSMENORRHEA: ICD-10-CM

## 2024-07-17 DIAGNOSIS — Z98.82 HISTORY OF ELECTIVE BREAST AUGMENTATION: ICD-10-CM

## 2024-07-17 DIAGNOSIS — N92.1 MENORRHAGIA WITH IRREGULAR CYCLE: ICD-10-CM

## 2024-07-17 DIAGNOSIS — N90.7 EPIDERMAL CYST OF VULVA: ICD-10-CM

## 2024-07-17 PROCEDURE — 88142 CYTOPATH C/V THIN LAYER: CPT

## 2024-07-17 PROCEDURE — 99385 PREV VISIT NEW AGE 18-39: CPT | Performed by: NURSE PRACTITIONER

## 2024-07-17 PROCEDURE — 87624 HPV HI-RISK TYP POOLED RSLT: CPT

## 2024-07-17 PROCEDURE — 88305 TISSUE EXAM BY PATHOLOGIST: CPT

## 2024-07-17 SDOH — ECONOMIC STABILITY: FOOD INSECURITY: WITHIN THE PAST 12 MONTHS, THE FOOD YOU BOUGHT JUST DIDN'T LAST AND YOU DIDN'T HAVE MONEY TO GET MORE.: NEVER TRUE

## 2024-07-17 SDOH — ECONOMIC STABILITY: HOUSING INSECURITY
IN THE LAST 12 MONTHS, WAS THERE A TIME WHEN YOU DID NOT HAVE A STEADY PLACE TO SLEEP OR SLEPT IN A SHELTER (INCLUDING NOW)?: NO

## 2024-07-17 SDOH — ECONOMIC STABILITY: INCOME INSECURITY: HOW HARD IS IT FOR YOU TO PAY FOR THE VERY BASICS LIKE FOOD, HOUSING, MEDICAL CARE, AND HEATING?: NOT HARD AT ALL

## 2024-07-17 SDOH — ECONOMIC STABILITY: FOOD INSECURITY: WITHIN THE PAST 12 MONTHS, YOU WORRIED THAT YOUR FOOD WOULD RUN OUT BEFORE YOU GOT MONEY TO BUY MORE.: NEVER TRUE

## 2024-07-17 ASSESSMENT — PATIENT HEALTH QUESTIONNAIRE - PHQ9
SUM OF ALL RESPONSES TO PHQ QUESTIONS 1-9: 0
2. FEELING DOWN, DEPRESSED OR HOPELESS: NOT AT ALL
1. LITTLE INTEREST OR PLEASURE IN DOING THINGS: NOT AT ALL
SUM OF ALL RESPONSES TO PHQ9 QUESTIONS 1 & 2: 0

## 2024-07-17 ASSESSMENT — ENCOUNTER SYMPTOMS
VOMITING: 0
ABDOMINAL PAIN: 0
CONSTIPATION: 0
SHORTNESS OF BREATH: 0
NAUSEA: 0
DIARRHEA: 0
GASTROINTESTINAL NEGATIVE: 1
RESPIRATORY NEGATIVE: 1

## 2024-07-20 LAB — HPV HIGH RISK: NOT DETECTED

## 2024-07-24 ENCOUNTER — HOSPITAL ENCOUNTER (OUTPATIENT)
Dept: WOMENS IMAGING | Age: 29
Discharge: HOME | End: 2024-07-24
Payer: COMMERCIAL

## 2024-07-24 ENCOUNTER — HOSPITAL ENCOUNTER (OUTPATIENT)
Dept: ULTRASOUND IMAGING | Age: 29
Discharge: HOME | End: 2024-07-24
Payer: COMMERCIAL

## 2024-07-24 VITALS — BODY MASS INDEX: 22.5 KG/M2 | HEIGHT: 63 IN | WEIGHT: 127 LBS

## 2024-07-24 DIAGNOSIS — N60.11 FIBROCYSTIC BREAST CHANGES, RIGHT: ICD-10-CM

## 2024-07-24 DIAGNOSIS — Z98.82 HISTORY OF ELECTIVE BREAST AUGMENTATION: ICD-10-CM

## 2024-07-24 PROCEDURE — G0279 TOMOSYNTHESIS, MAMMO: HCPCS

## 2024-07-24 PROCEDURE — 76642 ULTRASOUND BREAST LIMITED: CPT

## 2024-08-01 ENCOUNTER — PROCEDURE VISIT (OUTPATIENT)
Dept: OBGYN | Age: 29
End: 2024-08-01
Payer: COMMERCIAL

## 2024-08-01 VITALS
HEART RATE: 65 BPM | BODY MASS INDEX: 22.67 KG/M2 | DIASTOLIC BLOOD PRESSURE: 79 MMHG | SYSTOLIC BLOOD PRESSURE: 115 MMHG | WEIGHT: 128 LBS

## 2024-08-01 DIAGNOSIS — N64.9 BREAST LESION: ICD-10-CM

## 2024-08-01 DIAGNOSIS — N90.7 EPIDERMAL CYST OF VULVA: Primary | ICD-10-CM

## 2024-08-01 PROCEDURE — 56405 I&D VULVA/PERINEAL ABSCESS: CPT | Performed by: NURSE PRACTITIONER

## 2024-08-01 PROCEDURE — 99213 OFFICE O/P EST LOW 20 MIN: CPT | Performed by: NURSE PRACTITIONER

## 2024-08-01 PROCEDURE — 4004F PT TOBACCO SCREEN RCVD TLK: CPT | Performed by: NURSE PRACTITIONER

## 2024-08-01 PROCEDURE — G8420 CALC BMI NORM PARAMETERS: HCPCS | Performed by: NURSE PRACTITIONER

## 2024-08-01 PROCEDURE — G8427 DOCREV CUR MEDS BY ELIG CLIN: HCPCS | Performed by: NURSE PRACTITIONER

## 2024-08-01 NOTE — PROGRESS NOTES
8/1/24    Kishore Delacruz  1995    Indications:  This is a 29 y.o. female who presents today with a fluctuant and nontender less than 1 ccm cyst of the left vulva.  The risks, benefits, and assets of the procedure were discussed.  Her questions were answered, informed consent was obtained, the informed consent document was signed and she had no further questions.    Procedure:  The vulva was prepped with betadine.  Following the prep, 1 cc of 1% xylocaine was injected into the skin over the lesion area.  After adequate anesthesia was obtained, a number 11 scalpel was used to open the cyst.  Approximately 1 cc of yellow flud was released.  .  Bleeding was minimal.    Post Procedure:  The patient tolerated the procedure well.  There were no complications.  The patient reports no change in her symptoms after the procedure.     Diagnosis Orders   1. Epidermal cyst of vulva          Apply warm compresses QID  Keep area clean/dry    AMPARO Zayas - CNP

## 2024-08-01 NOTE — PROGRESS NOTES
Pt here to discuss mammogram and breast u/s results    Report reads: Real-time ultrasound examination confirms presence of a benign-appearing lesion  in the right breast at 1:00 location it has appearance of a fibrolipoma rather  than a pure lipoma as there are internal echoes along with hypoechoic linear areas    Pt declined u/s; will monitor area and report changes over the next 6 months at which time she is to have a repeat breast u/s. Pt voices understanding.

## 2024-10-01 ENCOUNTER — OFFICE VISIT (OUTPATIENT)
Dept: OBGYN | Age: 29
End: 2024-10-01
Payer: COMMERCIAL

## 2024-10-01 VITALS
HEIGHT: 63 IN | BODY MASS INDEX: 21.79 KG/M2 | WEIGHT: 123 LBS | SYSTOLIC BLOOD PRESSURE: 125 MMHG | HEART RATE: 103 BPM | DIASTOLIC BLOOD PRESSURE: 82 MMHG

## 2024-10-01 DIAGNOSIS — N92.0 MENORRHAGIA WITH REGULAR CYCLE: Primary | ICD-10-CM

## 2024-10-01 DIAGNOSIS — R11.0 NAUSEA: ICD-10-CM

## 2024-10-01 DIAGNOSIS — R63.4 WEIGHT LOSS: ICD-10-CM

## 2024-10-01 PROCEDURE — 4004F PT TOBACCO SCREEN RCVD TLK: CPT | Performed by: NURSE PRACTITIONER

## 2024-10-01 PROCEDURE — 99213 OFFICE O/P EST LOW 20 MIN: CPT | Performed by: NURSE PRACTITIONER

## 2024-10-01 PROCEDURE — G8484 FLU IMMUNIZE NO ADMIN: HCPCS | Performed by: NURSE PRACTITIONER

## 2024-10-01 PROCEDURE — G8420 CALC BMI NORM PARAMETERS: HCPCS | Performed by: NURSE PRACTITIONER

## 2024-10-01 PROCEDURE — 36415 COLL VENOUS BLD VENIPUNCTURE: CPT | Performed by: NURSE PRACTITIONER

## 2024-10-01 PROCEDURE — G8427 DOCREV CUR MEDS BY ELIG CLIN: HCPCS | Performed by: NURSE PRACTITIONER

## 2024-10-01 ASSESSMENT — ENCOUNTER SYMPTOMS
RESPIRATORY NEGATIVE: 1
DIARRHEA: 0
VOMITING: 0
CONSTIPATION: 0
SHORTNESS OF BREATH: 0
NAUSEA: 1
ABDOMINAL PAIN: 0

## 2024-10-01 NOTE — PROGRESS NOTES
10/1/24    Kishore Delacruz  1995    Chief Complaint   Patient presents with    Vaginal Bleeding     Patient presents today for abnormal bleeding. LMP: 2024. Pt states she has heavy bleeding that stops, turns into a dark red/black discharge for a day then starts back up with heavy bleeding. C/o sick in the morning and weight loss. Pt states about 10 years ago she had polyps and cysts on ovaries and remembers feeling the same way she does now        Kishore Delacruz is a 29 y.o. female who presents today for evaluation of menorrhagia, wt loss (4lb loss since 2024 despite increasing caloric intake) and nausea.     Past Medical History:   Diagnosis Date    Arthritis     knees    Dysmenorrhea     Irregular menses     Menorrhagia     Pyelonephritis complicating pregnancy 2013       Past Surgical History:   Procedure Laterality Date    APPENDECTOMY      BREAST ENHANCEMENT SURGERY Bilateral     2022    CERVICAL POLYP REMOVAL      uterine polyp       Social History     Tobacco Use    Smoking status: Every Day     Current packs/day: 0.00     Average packs/day: 1 pack/day for 10.0 years (10.0 ttl pk-yrs)     Types: Cigarettes     Start date: 10/26/2011     Last attempt to quit: 10/26/2021     Years since quittin.9    Smokeless tobacco: Never    Tobacco comments:     states has tried numerous times but cannot   Vaping Use    Vaping status: Every Day    Start date: 3/1/2017    Last attempt to quit: 2018    Substances: Nicotine   Substance Use Topics    Alcohol use: Yes    Drug use: Yes     Frequency: 7.0 times per week     Types: Marijuana (Weed)       Family History   Problem Relation Age of Onset    Arthritis Mother     Depression Mother     Miscarriages / Stillbirths Mother     Cancer Father     Early Death Father     Other Brother         hersprungs disease    Allergy (Severe) Brother     Anemia Brother     Asthma Brother     Birth Defects Brother     Alcohol Abuse Maternal Grandmother

## 2024-10-02 LAB
ALBUMIN SERPL-MCNC: 4.8 G/DL (ref 3.4–5)
ALBUMIN/GLOB SERPL: 2.4 {RATIO} (ref 1.1–2.2)
ALP SERPL-CCNC: 63 U/L (ref 40–129)
ALT SERPL-CCNC: 24 U/L (ref 10–40)
ANION GAP SERPL CALCULATED.3IONS-SCNC: 15 MMOL/L (ref 3–16)
AST SERPL-CCNC: 24 U/L (ref 15–37)
BILIRUB SERPL-MCNC: 0.3 MG/DL (ref 0–1)
BUN SERPL-MCNC: 7 MG/DL (ref 7–20)
CALCIUM SERPL-MCNC: 10.2 MG/DL (ref 8.3–10.6)
CHLORIDE SERPL-SCNC: 102 MMOL/L (ref 99–110)
CO2 SERPL-SCNC: 24 MMOL/L (ref 21–32)
CREAT SERPL-MCNC: 0.7 MG/DL (ref 0.6–1.1)
DEPRECATED RDW RBC AUTO: 12.8 % (ref 12.4–15.4)
GFR SERPLBLD CREATININE-BSD FMLA CKD-EPI: >90 ML/MIN/{1.73_M2}
GLUCOSE SERPL-MCNC: 75 MG/DL (ref 70–99)
HCT VFR BLD AUTO: 44.9 % (ref 36–48)
HGB BLD-MCNC: 15.6 G/DL (ref 12–16)
MCH RBC QN AUTO: 32.7 PG (ref 26–34)
MCHC RBC AUTO-ENTMCNC: 34.8 G/DL (ref 31–36)
MCV RBC AUTO: 94 FL (ref 80–100)
PLATELET # BLD AUTO: 262 K/UL (ref 135–450)
PMV BLD AUTO: 7.9 FL (ref 5–10.5)
POTASSIUM SERPL-SCNC: 4.4 MMOL/L (ref 3.5–5.1)
PROT SERPL-MCNC: 6.8 G/DL (ref 6.4–8.2)
RBC # BLD AUTO: 4.77 M/UL (ref 4–5.2)
SODIUM SERPL-SCNC: 141 MMOL/L (ref 136–145)
TSH SERPL DL<=0.005 MIU/L-ACNC: 1.19 UIU/ML (ref 0.27–4.2)
WBC # BLD AUTO: 7.9 K/UL (ref 4–11)

## 2024-10-24 ENCOUNTER — OFFICE VISIT (OUTPATIENT)
Dept: OBGYN | Age: 29
End: 2024-10-24
Payer: COMMERCIAL

## 2024-10-24 VITALS
BODY MASS INDEX: 22.15 KG/M2 | SYSTOLIC BLOOD PRESSURE: 119 MMHG | HEIGHT: 63 IN | DIASTOLIC BLOOD PRESSURE: 82 MMHG | WEIGHT: 125 LBS

## 2024-10-24 DIAGNOSIS — N92.0 MENORRHAGIA WITH REGULAR CYCLE: Primary | ICD-10-CM

## 2024-10-24 PROCEDURE — G8427 DOCREV CUR MEDS BY ELIG CLIN: HCPCS | Performed by: OBSTETRICS & GYNECOLOGY

## 2024-10-24 PROCEDURE — 99213 OFFICE O/P EST LOW 20 MIN: CPT | Performed by: OBSTETRICS & GYNECOLOGY

## 2024-10-24 PROCEDURE — G8420 CALC BMI NORM PARAMETERS: HCPCS | Performed by: OBSTETRICS & GYNECOLOGY

## 2024-10-24 PROCEDURE — 4004F PT TOBACCO SCREEN RCVD TLK: CPT | Performed by: OBSTETRICS & GYNECOLOGY

## 2024-10-24 PROCEDURE — G8484 FLU IMMUNIZE NO ADMIN: HCPCS | Performed by: OBSTETRICS & GYNECOLOGY

## 2024-10-24 NOTE — PROGRESS NOTES
Per Ana holman online, no prior auth required for [address]  [facility] is not in network with Ana, scheduling at [facility]  See screen print in media tab  Ready to schedule  
   10/01/2024 1529 10/02/2024 0014 TSH with Reflex to FT4 [[id number]]   Blood    Component Value Units   TSH Reflex FT4 1.19 uIU/mL          10/01/2024 1529 10/02/2024 0026 CBC [[id number]]   Blood    Component Value Units   WBC 7.9 K/uL   RBC 4.77 M/uL   Hemoglobin 15.6 g/dL   Hematocrit 44.9 %   MCV 94.0 fL   MCH 32.7 pg   MCHC 34.8 g/dL   RDW 12.8 %   Platelets 262 K/uL   MPV 7.9 fL          10/01/2024 1529 10/02/2024 0014 Comprehensive Metabolic Panel [[id number]]   (Abnormal)   Blood    Component Value Units   Sodium 141 mmol/L   Potassium 4.4 mmol/L   Chloride 102 mmol/L   CO2 24 mmol/L   Anion Gap 15    Glucose 75 mg/dL   BUN 7 mg/dL   Creatinine 0.7 mg/dL   Est, Glom Filt Rate >90     Calcium 10.2 mg/dL   Total Protein 6.8 g/dL   Albumin 4.8 g/dL   Albumin/Globulin Ratio 2.4 High     Total Bilirubin 0.3 mg/dL   Alkaline Phosphatase 63 U/L   ALT 24 U/L   AST 24 U/L          07/17/2024 1000 07/20/2024 1540 Human papillomavirus (HPV) DNA probe cervical brush high risk [[id number]] Component Value   HPV High Risk NOT DETECTED           07/17/2024 0953 07/24/2024 0930 GYN Cytology [[id number]]  Component Value   No component results          See us 10/2024    ASSESSMENT AND PLAN   Diagnosis Orders   1. Menorrhagia with regular cycle        Discussed hormonal options.  Discussed surgical options.  Discussed monitoring.      Desires hysteroscopy and D&C.  Discussed procedure and risks     Return in about 4 weeks (around 11/21/2024).    Stepan Martinez MD

## 2024-10-28 ENCOUNTER — PREP FOR PROCEDURE (OUTPATIENT)
Dept: OBGYN | Age: 29
End: 2024-10-28

## 2024-10-28 DIAGNOSIS — N92.0 MENORRHAGIA WITH REGULAR CYCLE: ICD-10-CM

## 2024-11-20 RX ORDER — M-VIT,TX,IRON,MINS/CALC/FOLIC 27MG-0.4MG
1 TABLET ORAL DAILY
COMMUNITY

## 2024-11-20 NOTE — PROGRESS NOTES
.[facility] on 11[facility]27/24 you will be called 11/26/24 with times    NOTHING TO EAT OR DRINK AFTER MIDNIGHT DAY OF SURGERY    1. Enter thru the hospital main entrance on day of surgery, check in at the Information Desk. If you arrive prior to 6:00am, enter thru the ER entrance.    2. Follow the directions as prescribed by the doctor for your procedure and medications.         Morning of surgery take:  No medications         Stop vitamins, supplements and NSAIDS:  NOW    3. Check with your Doctor regarding stopping blood thinners and follow their instructions.    4. Do not smoke, vape or use chewing tobacco morning of surgery. Do not drink any alcoholic beverages 24 hours prior to surgery.       This includes NA Beer. No street drugs 7 days prior to surgery.  No marijuana 24-48 hours prior to surgery.    5. If you have dentures, contacts of glasses they will be removed before going to the OR; please bring a case.    6. Please bring picture ID, insurance card, paperwork from the doctor’s office (H & P, Consent, & card for implantable devices).    7. Take a shower with an antibacterial soap the night before surgery and the morning of surgery. Do not put anything on your skin      After your morning shower.    8. You will need a responsible adult to drive you home and check on you after surgery.

## 2024-11-20 NOTE — PROGRESS NOTES
11/20/24 - .LM with my call-back # concerning  surgery @ [facility] on  11/27/24.  Please call the PAT Nurse for a phone assessment and surgery instructions.

## 2024-11-25 ENCOUNTER — ANESTHESIA EVENT (OUTPATIENT)
Dept: OPERATING ROOM | Age: 29
End: 2024-11-25
Payer: COMMERCIAL

## 2024-11-25 ASSESSMENT — LIFESTYLE VARIABLES: SMOKING_STATUS: 1

## 2024-11-25 NOTE — ANESTHESIA PRE PROCEDURE
Department of Anesthesiology  Preprocedure Note       Name:  Kishore Delacruz   Age:  29 y.o.  :  1995                                          MRN:  [id number]         Date:  2024      Surgeon: Surgeon(s):  Stepan Martinez MD    Procedure: Procedure(s):  DILATATION AND CURETTAGE HYSTEROSCOPY    Medications prior to admission:   Prior to Admission medications    Medication Sig Start Date End Date Taking? Authorizing Provider   Multiple Vitamins-Minerals (THERAPEUTIC MULTIVITAMIN-MINERALS) tablet Take 1 tablet by mouth daily   Yes Marisol Garcia MD   omeprazole (PRILOSEC) 20 MG delayed release capsule Take 1 capsule by mouth every morning (before breakfast)  Patient not taking: Reported on 2024 10/27/23   Scar Early MD   ondansetron (ZOFRAN) 4 MG tablet Take 1 tablet by mouth 3 times daily as needed for Nausea or Vomiting  Patient not taking: Reported on 2024 10/27/23   Scar Early MD   azithromycin (ZITHROMAX) 250 MG tablet Use as directed  Patient not taking: Reported on 21   Sai Early DO   ibuprofen (ADVIL;MOTRIN) 800 MG tablet Take 1 tablet by mouth every 8 hours  Patient not taking: Reported on 2024   Chaz Dodge MD   ondansetron (ZOFRAN ODT) 4 MG disintegrating tablet Take 4 mg by mouth every 8 hours as needed for Nausea or Vomiting  Patient not taking: Reported on 2024    Marisol Garcia MD   diphenhydrAMINE (BENADRYL) 25 MG capsule Take 25 mg by mouth nightly as needed for Itching  Patient not taking: Reported on 2024    Marisol Garcia MD       Current medications:    No current facility-administered medications for this encounter.     Current Outpatient Medications   Medication Sig Dispense Refill    Multiple Vitamins-Minerals (THERAPEUTIC MULTIVITAMIN-MINERALS) tablet Take 1 tablet by mouth daily      omeprazole (PRILOSEC) 20 MG delayed release capsule Take 1 capsule by mouth every morning (before

## 2024-11-26 NOTE — PROGRESS NOTES
11/26/24 - .LM for patient surgery @ [facility] on  11/27/24 @ 1115, arrival 0915. NPO status and morning medications reviewed. Please call with any questions.

## 2024-11-27 ENCOUNTER — HOSPITAL ENCOUNTER (OUTPATIENT)
Age: 29
Setting detail: OUTPATIENT SURGERY
Discharge: HOME | End: 2024-11-27
Attending: OBSTETRICS & GYNECOLOGY | Admitting: OBSTETRICS & GYNECOLOGY
Payer: COMMERCIAL

## 2024-11-27 ENCOUNTER — ANESTHESIA (OUTPATIENT)
Dept: OPERATING ROOM | Age: 29
End: 2024-11-27
Payer: COMMERCIAL

## 2024-11-27 VITALS
DIASTOLIC BLOOD PRESSURE: 61 MMHG | RESPIRATION RATE: 16 BRPM | TEMPERATURE: 98 F | WEIGHT: 125 LBS | OXYGEN SATURATION: 97 % | BODY MASS INDEX: 22.14 KG/M2 | SYSTOLIC BLOOD PRESSURE: 91 MMHG | HEART RATE: 53 BPM

## 2024-11-27 DIAGNOSIS — N92.0 MENORRHAGIA WITH REGULAR CYCLE: ICD-10-CM

## 2024-11-27 LAB
ERYTHROCYTE [DISTWIDTH] IN BLOOD BY AUTOMATED COUNT: 11.9 % (ref 11.7–14.9)
HCG, PREGNANCY URINE (POC): NEGATIVE
HCT VFR BLD AUTO: 42.8 % (ref 37–47)
HGB BLD-MCNC: 14.4 G/DL (ref 12.5–16)
MCH RBC QN AUTO: 31.6 PG (ref 27–31)
MCHC RBC AUTO-ENTMCNC: 33.6 G/DL (ref 32–36)
MCV RBC AUTO: 94.1 FL (ref 78–100)
PLATELET # BLD AUTO: 264 K/UL (ref 140–440)
PMV BLD AUTO: 8.9 FL (ref 7.5–11.1)
RBC # BLD AUTO: 4.55 M/UL (ref 4.2–5.4)
WBC OTHER # BLD: 7.3 K/UL (ref 4–10.5)

## 2024-11-27 PROCEDURE — 2580000003 HC RX 258: Performed by: ANESTHESIOLOGY

## 2024-11-27 PROCEDURE — 6370000000 HC RX 637 (ALT 250 FOR IP): Performed by: ANESTHESIOLOGY

## 2024-11-27 PROCEDURE — 6360000002 HC RX W HCPCS: Performed by: NURSE ANESTHETIST, CERTIFIED REGISTERED

## 2024-11-27 PROCEDURE — 7100000000 HC PACU RECOVERY - FIRST 15 MIN: Performed by: OBSTETRICS & GYNECOLOGY

## 2024-11-27 PROCEDURE — 3700000001 HC ADD 15 MINUTES (ANESTHESIA): Performed by: OBSTETRICS & GYNECOLOGY

## 2024-11-27 PROCEDURE — 7100000011 HC PHASE II RECOVERY - ADDTL 15 MIN: Performed by: OBSTETRICS & GYNECOLOGY

## 2024-11-27 PROCEDURE — 88305 TISSUE EXAM BY PATHOLOGIST: CPT

## 2024-11-27 PROCEDURE — 7100000010 HC PHASE II RECOVERY - FIRST 15 MIN: Performed by: OBSTETRICS & GYNECOLOGY

## 2024-11-27 PROCEDURE — 3600000013 HC SURGERY LEVEL 3 ADDTL 15MIN: Performed by: OBSTETRICS & GYNECOLOGY

## 2024-11-27 PROCEDURE — 2709999900 HC NON-CHARGEABLE SUPPLY: Performed by: OBSTETRICS & GYNECOLOGY

## 2024-11-27 PROCEDURE — 85027 COMPLETE CBC AUTOMATED: CPT

## 2024-11-27 PROCEDURE — 6370000000 HC RX 637 (ALT 250 FOR IP): Performed by: OBSTETRICS & GYNECOLOGY

## 2024-11-27 PROCEDURE — 81025 URINE PREGNANCY TEST: CPT

## 2024-11-27 PROCEDURE — 3600000003 HC SURGERY LEVEL 3 BASE: Performed by: OBSTETRICS & GYNECOLOGY

## 2024-11-27 PROCEDURE — 6360000002 HC RX W HCPCS: Performed by: ANESTHESIOLOGY

## 2024-11-27 PROCEDURE — 3700000000 HC ANESTHESIA ATTENDED CARE: Performed by: OBSTETRICS & GYNECOLOGY

## 2024-11-27 PROCEDURE — 7100000001 HC PACU RECOVERY - ADDTL 15 MIN: Performed by: OBSTETRICS & GYNECOLOGY

## 2024-11-27 PROCEDURE — 58558 HYSTEROSCOPY BIOPSY: CPT | Performed by: OBSTETRICS & GYNECOLOGY

## 2024-11-27 RX ORDER — SODIUM CHLORIDE 9 MG/ML
INJECTION, SOLUTION INTRAVENOUS CONTINUOUS
Status: DISCONTINUED | OUTPATIENT
Start: 2024-11-27 | End: 2024-11-27 | Stop reason: HOSPADM

## 2024-11-27 RX ORDER — ONDANSETRON 2 MG/ML
INJECTION INTRAMUSCULAR; INTRAVENOUS
Status: DISCONTINUED | OUTPATIENT
Start: 2024-11-27 | End: 2024-11-27 | Stop reason: SDUPTHER

## 2024-11-27 RX ORDER — SODIUM CHLORIDE 0.9 % (FLUSH) 0.9 %
5-40 SYRINGE (ML) INJECTION PRN
Status: DISCONTINUED | OUTPATIENT
Start: 2024-11-27 | End: 2024-11-27 | Stop reason: HOSPADM

## 2024-11-27 RX ORDER — SODIUM CHLORIDE, SODIUM LACTATE, POTASSIUM CHLORIDE, CALCIUM CHLORIDE 600; 310; 30; 20 MG/100ML; MG/100ML; MG/100ML; MG/100ML
INJECTION, SOLUTION INTRAVENOUS CONTINUOUS
Status: DISCONTINUED | OUTPATIENT
Start: 2024-11-27 | End: 2024-11-27 | Stop reason: HOSPADM

## 2024-11-27 RX ORDER — SODIUM CHLORIDE 9 MG/ML
INJECTION, SOLUTION INTRAVENOUS PRN
Status: DISCONTINUED | OUTPATIENT
Start: 2024-11-27 | End: 2024-11-27 | Stop reason: HOSPADM

## 2024-11-27 RX ORDER — FENTANYL CITRATE 50 UG/ML
INJECTION, SOLUTION INTRAMUSCULAR; INTRAVENOUS
Status: DISCONTINUED | OUTPATIENT
Start: 2024-11-27 | End: 2024-11-27 | Stop reason: SDUPTHER

## 2024-11-27 RX ORDER — ONDANSETRON 2 MG/ML
4 INJECTION INTRAMUSCULAR; INTRAVENOUS
Status: COMPLETED | OUTPATIENT
Start: 2024-11-27 | End: 2024-11-27

## 2024-11-27 RX ORDER — ACETAMINOPHEN 325 MG/1
650 TABLET ORAL
Status: DISCONTINUED | OUTPATIENT
Start: 2024-11-27 | End: 2024-11-27 | Stop reason: HOSPADM

## 2024-11-27 RX ORDER — SCOLOPAMINE TRANSDERMAL SYSTEM 1 MG/1
1 PATCH, EXTENDED RELEASE TRANSDERMAL ONCE
Status: DISCONTINUED | OUTPATIENT
Start: 2024-11-27 | End: 2024-11-27 | Stop reason: HOSPADM

## 2024-11-27 RX ORDER — DEXAMETHASONE SODIUM PHOSPHATE 4 MG/ML
INJECTION, SOLUTION INTRA-ARTICULAR; INTRALESIONAL; INTRAMUSCULAR; INTRAVENOUS; SOFT TISSUE
Status: DISCONTINUED | OUTPATIENT
Start: 2024-11-27 | End: 2024-11-27 | Stop reason: SDUPTHER

## 2024-11-27 RX ORDER — ACETAMINOPHEN 500 MG
1000 TABLET ORAL ONCE
Status: COMPLETED | OUTPATIENT
Start: 2024-11-27 | End: 2024-11-27

## 2024-11-27 RX ORDER — KETOROLAC TROMETHAMINE 10 MG/1
10 TABLET, FILM COATED ORAL EVERY 6 HOURS PRN
Qty: 20 TABLET | Refills: 0 | Status: SHIPPED | OUTPATIENT
Start: 2024-11-27 | End: 2025-11-27

## 2024-11-27 RX ORDER — SODIUM CHLORIDE 0.9 % (FLUSH) 0.9 %
5-40 SYRINGE (ML) INJECTION EVERY 12 HOURS SCHEDULED
Status: DISCONTINUED | OUTPATIENT
Start: 2024-11-27 | End: 2024-11-27 | Stop reason: HOSPADM

## 2024-11-27 RX ORDER — METOCLOPRAMIDE HYDROCHLORIDE 5 MG/ML
10 INJECTION INTRAMUSCULAR; INTRAVENOUS
Status: DISCONTINUED | OUTPATIENT
Start: 2024-11-27 | End: 2024-11-27 | Stop reason: HOSPADM

## 2024-11-27 RX ORDER — PROPOFOL 10 MG/ML
INJECTION, EMULSION INTRAVENOUS
Status: DISCONTINUED | OUTPATIENT
Start: 2024-11-27 | End: 2024-11-27 | Stop reason: SDUPTHER

## 2024-11-27 RX ORDER — CELECOXIB 200 MG/1
200 CAPSULE ORAL ONCE
Status: COMPLETED | OUTPATIENT
Start: 2024-11-27 | End: 2024-11-27

## 2024-11-27 RX ORDER — DIPHENHYDRAMINE HYDROCHLORIDE 50 MG/ML
12.5 INJECTION INTRAMUSCULAR; INTRAVENOUS
Status: DISCONTINUED | OUTPATIENT
Start: 2024-11-27 | End: 2024-11-27 | Stop reason: HOSPADM

## 2024-11-27 RX ORDER — LIDOCAINE HYDROCHLORIDE 20 MG/ML
INJECTION, SOLUTION EPIDURAL; INFILTRATION; INTRACAUDAL; PERINEURAL
Status: DISCONTINUED | OUTPATIENT
Start: 2024-11-27 | End: 2024-11-27 | Stop reason: SDUPTHER

## 2024-11-27 RX ORDER — NALOXONE HYDROCHLORIDE 0.4 MG/ML
INJECTION, SOLUTION INTRAMUSCULAR; INTRAVENOUS; SUBCUTANEOUS PRN
Status: DISCONTINUED | OUTPATIENT
Start: 2024-11-27 | End: 2024-11-27 | Stop reason: HOSPADM

## 2024-11-27 RX ORDER — LABETALOL HYDROCHLORIDE 5 MG/ML
10 INJECTION, SOLUTION INTRAVENOUS
Status: DISCONTINUED | OUTPATIENT
Start: 2024-11-27 | End: 2024-11-27 | Stop reason: HOSPADM

## 2024-11-27 RX ADMIN — HYDROMORPHONE HYDROCHLORIDE 0.5 MG: 1 INJECTION, SOLUTION INTRAMUSCULAR; INTRAVENOUS; SUBCUTANEOUS at 13:55

## 2024-11-27 RX ADMIN — ONDANSETRON 4 MG: 2 INJECTION INTRAMUSCULAR; INTRAVENOUS at 13:10

## 2024-11-27 RX ADMIN — CELECOXIB 200 MG: 200 CAPSULE ORAL at 09:41

## 2024-11-27 RX ADMIN — DEXAMETHASONE SODIUM PHOSPHATE 4 MG: 4 INJECTION, SOLUTION INTRAMUSCULAR; INTRAVENOUS at 13:10

## 2024-11-27 RX ADMIN — ONDANSETRON 4 MG: 2 INJECTION INTRAMUSCULAR; INTRAVENOUS at 14:14

## 2024-11-27 RX ADMIN — LIDOCAINE HYDROCHLORIDE 50 MG: 20 INJECTION, SOLUTION EPIDURAL; INFILTRATION; INTRACAUDAL; PERINEURAL at 13:05

## 2024-11-27 RX ADMIN — SODIUM CHLORIDE, POTASSIUM CHLORIDE, SODIUM LACTATE AND CALCIUM CHLORIDE: 600; 310; 30; 20 INJECTION, SOLUTION INTRAVENOUS at 09:35

## 2024-11-27 RX ADMIN — PROPOFOL 150 MG: 10 INJECTION, EMULSION INTRAVENOUS at 13:05

## 2024-11-27 RX ADMIN — FENTANYL CITRATE 100 MCG: 50 INJECTION, SOLUTION INTRAMUSCULAR; INTRAVENOUS at 13:05

## 2024-11-27 RX ADMIN — ACETAMINOPHEN 1000 MG: 500 TABLET ORAL at 09:41

## 2024-11-27 ASSESSMENT — PAIN DESCRIPTION - DESCRIPTORS
DESCRIPTORS: CRAMPING

## 2024-11-27 ASSESSMENT — PAIN DESCRIPTION - PAIN TYPE: TYPE: SURGICAL PAIN

## 2024-11-27 ASSESSMENT — LIFESTYLE VARIABLES: SMOKING_STATUS: 1

## 2024-11-27 ASSESSMENT — PAIN - FUNCTIONAL ASSESSMENT
PAIN_FUNCTIONAL_ASSESSMENT: 0-10
PAIN_FUNCTIONAL_ASSESSMENT: PREVENTS OR INTERFERES SOME ACTIVE ACTIVITIES AND ADLS
PAIN_FUNCTIONAL_ASSESSMENT: 0-10
PAIN_FUNCTIONAL_ASSESSMENT: 0-10

## 2024-11-27 ASSESSMENT — PAIN DESCRIPTION - LOCATION: LOCATION: ABDOMEN

## 2024-11-27 ASSESSMENT — PAIN DESCRIPTION - ORIENTATION: ORIENTATION: LOWER

## 2024-11-27 ASSESSMENT — PAIN DESCRIPTION - ONSET: ONSET: ON-GOING

## 2024-11-27 ASSESSMENT — PAIN SCALES - GENERAL: PAINLEVEL_OUTOF10: 7

## 2024-11-27 ASSESSMENT — PAIN DESCRIPTION - FREQUENCY: FREQUENCY: CONTINUOUS

## 2024-11-27 NOTE — ANESTHESIA PRE PROCEDURE
Department of Anesthesiology  Preprocedure Note       Name:  Kishore Delacruz   Age:  29 y.o.  :  1995                                          MRN:  [id number]         Date:  2024      Surgeon: Surgeon(s):  Stepan Martinez MD    Procedure: Procedure(s):  DILATATION AND CURETTAGE HYSTEROSCOPY    Medications prior to admission:   Prior to Admission medications    Medication Sig Start Date End Date Taking? Authorizing Provider   Multiple Vitamins-Minerals (THERAPEUTIC MULTIVITAMIN-MINERALS) tablet Take 1 tablet by mouth daily   Yes Marisol Garcia MD   omeprazole (PRILOSEC) 20 MG delayed release capsule Take 1 capsule by mouth every morning (before breakfast)  Patient not taking: Reported on 2024 10/27/23   Scar Early MD   ondansetron (ZOFRAN) 4 MG tablet Take 1 tablet by mouth 3 times daily as needed for Nausea or Vomiting  Patient not taking: Reported on 2024 10/27/23   Scar Early MD   azithromycin (ZITHROMAX) 250 MG tablet Use as directed  Patient not taking: Reported on 21   Sai Early DO   ibuprofen (ADVIL;MOTRIN) 800 MG tablet Take 1 tablet by mouth every 8 hours  Patient not taking: Reported on 2024   Chaz Dodge MD   ondansetron (ZOFRAN ODT) 4 MG disintegrating tablet Take 4 mg by mouth every 8 hours as needed for Nausea or Vomiting  Patient not taking: Reported on 2024    Marisol Garcia MD   diphenhydrAMINE (BENADRYL) 25 MG capsule Take 25 mg by mouth nightly as needed for Itching  Patient not taking: Reported on 2024    Marisol Garcia MD       Current medications:    Current Facility-Administered Medications   Medication Dose Route Frequency Provider Last Rate Last Admin    lactated ringers infusion   IntraVENous Continuous Alexandra Delgadillo MD 50 mL/hr at 24 0935 New Bag at 24 0935    0.9 % sodium chloride infusion   IntraVENous Continuous Stepan Martinez MD        sodium chloride

## 2024-11-27 NOTE — DISCHARGE INSTRUCTIONS
Stepan Martinez MD   [address] [place] [address]   Phone [phone]   Fax [phone]       POST-OPERATIVE INSTRUCTIONS        DANGER SIGNS: Call OB/GYN if they occur:     A fever of more than 100.4   Report excessive bleeding (saturating a pad every ½ to 1 hour).   Severe pain, unrelieved by normal medication.   Shortness of breath, difficulty breathing or chest pain.   Painful or burning urination.   Severe back pain.   Painful swelling or redness in legs.   Drainage or pus from the wound.   Foul smelling vaginal discharge.     Eating and drinking:     You may continue your regular diet, however, we recommend that you:        Activity:     Avoid strenuous physical activity.       Medication:     You may resume all your usual medications after surgery, unless told otherwise. While you will be sent home with strong pain medications containing small amounts of narcotics, you may also take milder medications such as Naprosyn (Aleve), Acetaminophen (Tylenol), or Ibuprofen (Motrin) for your pain.       Vaginal Bleeding: It is normal to experience vaginal spotting and light discharge for up to a month after surgery, whether incision was abdominal or vaginal.       Return Visit: You will need to return to see your surgeon two weeks after the date of your surgery. At that time, your incision will be checked and any questions you may have will be answered (i.e. what more you can do physically; such as driving a car, exercise).     Returning to work:   This depends on the type of surgery you had and how quickly you recover. Your doctor will determine when it’s appropriate for you to return to work.   Leave surgical dressing/bandages on for 24 hours.   You may shower or bathe in the morning.   You may experience some shoulder pain (especially on the right) and chest pain. This is normal and caused by the gas injected into the abdomen. The gas is inserted to create a working and viewing space inside  the abdomen during surgery.   No intercourse, douching or tampons for 7 days.   Expect some vaginal bleeding   Take pain medication as directed.   You may resume your normal daily activities as tolerated.   Resume your normal diet. Try to avoid constipation by eating high fiber foods or adding a fiber supplement such as Metamucil, Fibercon, or Benefiber; especially while taking a narcotic pain medication.

## 2024-11-27 NOTE — PROGRESS NOTES
1450:  Discharge instructions discussed with patient and mother, both verbalized an understanding.

## 2024-11-27 NOTE — PROGRESS NOTES
1335 Pt arrived from OR to PACU. Monitors applied and alarms in place. Report rec'd from Brian VENEGAS and Gonzalo GOSS. Oral airway in place.  1350 Oral airway removed. Pt alert and oriented able to follow commands  1355 Pt medicated for pain see MAR  1405 Pt tolerating ice chips appropriately  1414 Pt medicated for nausea see MAR  1415 Kylie pad in place. Dressing with scant amount of bloody drainage.   1418 Pt transported to [facility] by this RN. Report given to Jamie VENEGAS.

## 2024-11-27 NOTE — PROGRESS NOTES
1500:  Pt discharged to home alert and oriented with 2/10 c/o abd cramping.  Pt tolerating PO, VSS.

## 2024-11-27 NOTE — OP NOTE
DATE OF PROCEDURE:                      11/27/2024    SURGEON:                                             Stepan Martinez MD    PREOPERATIVE DIAGNOSIS:              menorrhagia    POSTOPERATIVE DIAGNOSIS:            same    OPERATION:                                         Hysteroscopy and D&C.     ANESTHESIA:                                        General    ESTIMATED BLOOD LOSS:                 Minimal.     COMPLICATIONS:                                 None.     SPECIMEN:         1.  ECC  2/ [facility]    INDICATION:        AUB    FINDINGS:         Normal ectocervix.  Hysteroscopy reveals a normal endocervical canal.  Hysteroscopy reveals a normal endometrial cavity.  No evidence of a submucosal myoma and no evidence of an endometrial polyp.      PROCEDURE NOTE: After informed consent was obtained, patient brought the operating suite where general anesthesia obtained by the anesthesia service without complication.  Patient was then placed in the dorsal lithotomy position. The perineum was scrubbed and draped in the usual fashion. A speculum was placed in the vagina. The anterior lip of the cervix was grabbed by single-tooth tenaculum.  An endocervical curettage was performed.  The cervical os was dilated with Sae dilators. A diagnostic hysteroscope was introduced into the uterine cavity, and using normal saline as distending medium, diagnostic hysteroscopy was carried out.  The hysteroscope was then withdrawn. Next, sharp curettage of the endometrium was performed, and all tissues were submitted to pathology.  The single-tooth tenaculum was removed from the patient's cervix.  Inspection revealed excellent hemostasis. The procedure was then terminated. All instruments were removed.  Patient was then placed in the supine position and awoken from anesthesia by the anesthesia service.  Patient was brought to the recovery room in stable condition and there were no complications.  Patient tolerated the procedure

## 2024-11-27 NOTE — H&P
Patel Delacruz  1995  Primary Care Physician: No primary care provider on file.    HISTORY & PHYSICAL        HOSPITAL:  [facility]    HPI: Patel Delacruz is a 29 y.o. female  is menorrhagia    No diagnosis found.   Patient Active Problem List   Diagnosis    Hyperemesis arising during pregnancy    Pyelonephritis complicating pregnancy    Normal labor and delivery    Pregnancy, incidental    Pregnancy related condition    Pregnancy related condition in third trimester    Menorrhagia with regular cycle       OB History    Para Term  AB Living   6 5 5 0 1 4   SAB IAB Ectopic Molar Multiple Live Births   1 0 0 0 0 4      # Outcome Date GA Lbr Benja/2nd Weight Sex Type Anes PTL Lv   6 Term 19 39w1d 06:23  00:07 2.903 kg (6 lb 6.4 oz) F Vag-Spont EPI N KHADAR      Name: LULYBABY GIRL PATEL      Apgar1: 9  Apgar5: 9   5 SAB 06/15/18           4 Term 17 39w2d  3.425 kg (7 lb 8.8 oz) M Vag-Spont EPI N KHADAR      Name: LULYBABY BOY      Apgar1: 9  Apgar5: 9   3 Term 14 39w0d  3.147 kg (6 lb 15 oz) F Vag-Spont  Y KHADAR      Name: Wandy   2 Term 11 41w0d  3.43 kg (7 lb 9 oz) F Vag-Spont EPI N KHADAR   1 Term              Past Medical History:   Diagnosis Date    Arthritis     knees    Dysmenorrhea     Irregular menses     Menorrhagia     Pyelonephritis complicating pregnancy 2013       Past Surgical History:   Procedure Laterality Date    APPENDECTOMY      BREAST ENHANCEMENT SURGERY Bilateral     2022    CERVICAL POLYP REMOVAL      uterine polyp    TUBAL LIGATION  2019       Social History     Socioeconomic History    Marital status: Single     Spouse name: Not on file    Number of children: Not on file    Years of education: Not on file    Highest education level: Not on file   Occupational History    Not on file   Tobacco Use    Smoking status: Every Day     Current packs/day: 0.00     Average packs/day: 1 pack/day for 10.0

## 2024-12-03 LAB — SURGICAL PATHOLOGY REPORT: NORMAL

## 2024-12-06 NOTE — ANESTHESIA POSTPROCEDURE EVALUATION
Department of Anesthesiology  Postprocedure Note    Patient: Kishore Delacruz  MRN: [id number]  YOB: 1995  Date of evaluation: 12/6/2024    Procedure Summary       Date: 11/27/24 Room / Location: [address]    Anesthesia Start: 1301 Anesthesia Stop: 1344    Procedure: DILATATION AND CURETTAGE HYSTEROSCOPY Diagnosis:       Menorrhagia with regular cycle      (Menorrhagia with regular cycle [N92.0])    Surgeons: Stepan Martinez MD Responsible Provider: Jose Camarillo MD    Anesthesia Type: general ASA Status: 2            Anesthesia Type: No value filed.    Raj Phase I: Raj Score: 10    Raj Phase II: Raj Score: 10    Anesthesia Post Evaluation    Patient location during evaluation: bedside  Patient participation: complete - patient participated  Level of consciousness: awake and alert  Airway patency: patent  Nausea & Vomiting: no nausea and no vomiting  Cardiovascular status: hemodynamically stable  Respiratory status: acceptable  Hydration status: euvolemic  Pain management: adequate        No notable events documented.

## 2024-12-26 NOTE — PROGRESS NOTES
RT responded to rapid response called for acute hypoxic respiratory failure with concern for aspiration. Upon arrival, pt was on  5L oxymask with sats at 96%, and RR 18. Per bedside RN pt has had oxygen desaturations before, however, this time she had desat to lowest of 68% with slow recovery having increased oxygen needs from 1L to 15L oxymask. Breath sounds diminished bilaterally. RT suctioned pt to get moderate amount of oral yellow thick secretions. Pt has a fair congested cough and was able to be weaned down to 2L oxymask, SPO2 97%.    Holley Randolph, RT on 12/26/2024 at 2:56 PM     selected administration types on file for this patient.    Review of Systems   Constitutional: Negative.  Negative for fatigue.   Respiratory: Negative.  Negative for shortness of breath.    Gastrointestinal: Negative.  Negative for abdominal pain, constipation, diarrhea, nausea and vomiting.   Genitourinary:  Positive for menstrual problem.   Neurological:  Negative for dizziness.   Psychiatric/Behavioral: Negative.         /76 (Site: Left Upper Arm, Position: Sitting, Cuff Size: Small Adult)   Ht 1.6 m (5' 3\")   Wt 57.6 kg (127 lb)   LMP 06/20/2024 (Approximate)   BMI 22.50 kg/m²     Physical Exam  Vitals and nursing note reviewed.   Constitutional:       Appearance: Normal appearance. She is normal weight.   HENT:      Head: Normocephalic.   Pulmonary:      Effort: Pulmonary effort is normal. No respiratory distress.   Chest:   Breasts:     Right: No nipple discharge or tenderness.      Left: No nipple discharge or tenderness.      Comments: Bilateral breast augmentation  Fibrocystic breast changes 1:00 R breast  Abdominal:      Palpations: Abdomen is soft.      Tenderness: There is no abdominal tenderness.   Genitourinary:     General: Normal vulva.      Exam position: Lithotomy position.      Vagina: Normal.      Cervix: Normal.      Uterus: Normal.       Adnexa: Right adnexa normal and left adnexa normal.      Rectum: Normal.   Musculoskeletal:         General: Normal range of motion.      Cervical back: Normal and normal range of motion.      Lumbar back: Normal.   Lymphadenopathy:      Cervical: No cervical adenopathy.      Upper Body:      Right upper body: No supraclavicular or axillary adenopathy.      Left upper body: No supraclavicular or axillary adenopathy.      Lower Body: No right inguinal adenopathy. No left inguinal adenopathy.   Skin:     General: Skin is warm and dry.   Neurological:      General: No focal deficit present.      Mental Status: She is alert and oriented to person, place,

## 2025-01-15 ENCOUNTER — HOSPITAL ENCOUNTER (OUTPATIENT)
Dept: ULTRASOUND IMAGING | Age: 30
Discharge: HOME | End: 2025-01-15
Payer: COMMERCIAL

## 2025-01-15 DIAGNOSIS — N64.9 BREAST LESION: ICD-10-CM

## 2025-01-15 PROCEDURE — 76642 ULTRASOUND BREAST LIMITED: CPT

## 2025-08-05 ENCOUNTER — TELEPHONE (OUTPATIENT)
Dept: OBGYN | Age: 30
End: 2025-08-05

## 2025-08-05 DIAGNOSIS — Z09 FOLLOW-UP EXAM, 3-6 MONTHS SINCE PREVIOUS EXAM: ICD-10-CM

## 2025-08-05 DIAGNOSIS — N64.9 BREAST LESION: Primary | ICD-10-CM

## (undated) DEVICE — SOLUTION 1000ML NRML NACL

## (undated) DEVICE — GOWN,ECLIPSE,POLYRNF,BRTHSLV,L,30/CS: Brand: MEDLINE

## (undated) DEVICE — SET IRRIG L94IN ID0.281IN W/ 4.5IN DST FLX CONN 2 LD ON OFF

## (undated) DEVICE — MATERNITY PAD,HEAVY: Brand: CURITY

## (undated) DEVICE — PACK,BASIC,IX: Brand: MEDLINE

## (undated) DEVICE — MARKER SURG SKIN UTIL REGULAR/FINE 2 TIP W/ RUL AND 9 LBL

## (undated) DEVICE — LEGGINGS, PAIR, CLEAR, STERILE: Brand: MEDLINE

## (undated) DEVICE — GLOVE ORANGE PI 7   MSG9070

## (undated) DEVICE — SHEET,DRAPE,UNDERBUTTOCK,GRAD POUCH,PORT: Brand: MEDLINE

## (undated) DEVICE — TRAY PREP DRY W/ PREM GLV 2 APPL 6 SPNG 2 UNDPD 1 OVERWRAP

## (undated) DEVICE — TOWEL,OR,DSP,ST,BLUE,STD,6/PK,12PK/CS: Brand: MEDLINE

## (undated) DEVICE — 500ML,PRESSURE INFUSER W/STOPCOCK: Brand: MEDLINE

## (undated) DEVICE — TELFA NON-ADHERENT ABSORBENT DRESSING: Brand: TELFA

## (undated) DEVICE — Z INACTIVE USE 2863041 SPONGE GZ W4XL4IN 100% COT 16 PLY RADPQ HIGHLY ABSRB